# Patient Record
Sex: MALE | Race: WHITE | NOT HISPANIC OR LATINO | Employment: FULL TIME | URBAN - METROPOLITAN AREA
[De-identification: names, ages, dates, MRNs, and addresses within clinical notes are randomized per-mention and may not be internally consistent; named-entity substitution may affect disease eponyms.]

---

## 2017-11-17 ENCOUNTER — COMMUNICATION - HEALTHEAST (OUTPATIENT)
Dept: TELEHEALTH | Facility: CLINIC | Age: 32
End: 2017-11-17

## 2017-11-17 ENCOUNTER — OFFICE VISIT - HEALTHEAST (OUTPATIENT)
Dept: FAMILY MEDICINE | Facility: CLINIC | Age: 32
End: 2017-11-17

## 2017-11-17 DIAGNOSIS — Z23 NEED FOR VACCINATION: ICD-10-CM

## 2017-11-17 DIAGNOSIS — Z00.00 ROUTINE GENERAL MEDICAL EXAMINATION AT A HEALTH CARE FACILITY: ICD-10-CM

## 2017-11-17 DIAGNOSIS — H66.92 ACUTE OTITIS MEDIA, LEFT: ICD-10-CM

## 2017-11-17 DIAGNOSIS — G40.909 EPILEPSY (H): ICD-10-CM

## 2017-11-17 ASSESSMENT — MIFFLIN-ST. JEOR: SCORE: 1909.22

## 2018-08-26 ENCOUNTER — OFFICE VISIT (OUTPATIENT)
Dept: URGENT CARE | Facility: URGENT CARE | Age: 33
End: 2018-08-26
Payer: COMMERCIAL

## 2018-08-26 VITALS
TEMPERATURE: 98.4 F | WEIGHT: 202.5 LBS | SYSTOLIC BLOOD PRESSURE: 135 MMHG | BODY MASS INDEX: 29.48 KG/M2 | OXYGEN SATURATION: 99 % | DIASTOLIC BLOOD PRESSURE: 76 MMHG | HEART RATE: 84 BPM

## 2018-08-26 DIAGNOSIS — M54.9 MID BACK PAIN ON RIGHT SIDE: Primary | ICD-10-CM

## 2018-08-26 PROCEDURE — 99214 OFFICE O/P EST MOD 30 MIN: CPT | Performed by: PHYSICIAN ASSISTANT

## 2018-08-26 NOTE — MR AVS SNAPSHOT
"              After Visit Summary   2018    Fred Alva    MRN: [id number]           Patient Information     Date Of Birth          1985        Visit Information        Provider Department      2018 3:55 PM Joao Pollack PA-C [facility]        Today's Diagnoses     Mid back pain on right side    -  1       Follow-ups after your visit        Who to contact     If you have questions or need follow up information about today's clinic visit or your schedule please contact [facility] directly at [phone].  Normal or non-critical lab and imaging results will be communicated to you by Arkamihart, letter or phone within 4 business days after the clinic has received the results. If you do not hear from us within 7 days, please contact the clinic through Arkamihart or phone. If you have a critical or abnormal lab result, we will notify you by phone as soon as possible.  Submit refill requests through [employer] or call your pharmacy and they will forward the refill request to us. Please allow 3 business days for your refill to be completed.          Additional Information About Your Visit        MyChart Information     [employer] lets you send messages to your doctor, view your test results, renew your prescriptions, schedule appointments and more. To sign up, go to [email or web address] . Click on \"Log in\" on the left side of the screen, which will take you to the Welcome page. Then click on \"Sign up Now\" on the right side of the page.     You will be asked to enter the access code listed below, as well as some personal information. Please follow the directions to create your username and password.     Your access code is: [email or web address]  Expires: 2018  4:31 PM     Your access code will  in 90 days. If you need help or a new code, please call your [place] clinic or [phone].        Care EveryWhere ID     This is your Care EveryWhere ID. This could " be used by other organizations to access your [place] medical records  [id number]        Your Vitals Were     Pulse Temperature Pulse Oximetry BMI (Body Mass Index)          84 98.4  F (36.9  C) 99% 29.48 kg/m2         Blood Pressure from Last 3 Encounters:   08/26/18 135/76   04/02/14 142/90   08/31/11 156/84    Weight from Last 3 Encounters:   08/26/18 202 lb 8 oz (91.9 kg)   04/02/14 238 lb 3.2 oz (108 kg)   08/31/11 229 lb 11.2 oz (104.2 kg)              Today, you had the following     No orders found for display       Primary Care Provider Fax #    Physician No Ref-Primary [phone]       No address on file        Equal Access to Services     BERTIN MONROE : Hadii nima blueo Souma, waaxda luqadaha, qaybta kaalmada adeegyada, jael beckett . So [facility] [phone].    ATENCIÓN: Si habla español, tiene a smith disposición servicios gratuitos de asistencia lingüística. Llame al [phone].    We comply with applicable federal civil rights laws and [place] laws. We do not discriminate on the basis of race, color, national origin, age, disability, sex, sexual orientation, or gender identity.            Thank you!     Thank you for choosing [facility]  for your care. Our goal is always to provide you with excellent care. Hearing back from our patients is one way we can continue to improve our services. Please take a few minutes to complete the written survey that you may receive in the mail after your visit with us. Thank you!             Your Updated Medication List - Protect others around you: Learn how to safely use, store and throw away your medicines at www.disposemymeds.org.          This list is accurate as of 8/26/18  4:31 PM.  Always use your most recent med list.                   Brand Name Dispense Instructions for use Diagnosis    fluconazole 100 MG tablet    DIFLUCAN    15 tablet    2 tabs PO qday x 1 day, then 1 tab PO qday x 13 days for a  total of 14 day course.    [place]

## 2018-08-26 NOTE — PROGRESS NOTES
"SUBJECTIVE  HPI: Fred Alva is a 32 year old male who presents for evaluation of back pain  Symptoms began 1 day(s) ago, have been onset acute and are worse.  Pain is located in the low back right region, with radiation to does not radiate, and are at worst a 6 on a scale of 1-10.  Recent injury:none recalled by the patient. He does circus aerial work.   Personal hx of back pain is no prior back problems.  Pain is exacerbated by: standing, walking, bending and changing position.  Pain is relieved by: OTC NSAIDs[unfilled] sx include: none.  Red flag symptoms: negative.    Past Medical History:   Diagnosis Date     Balance problem      Epilepsy (H)      Current Outpatient Prescriptions   Medication Sig Dispense Refill     fluconazole (DIFLUCAN) 100 MG tablet 2 tabs PO qday x 1 day, then 1 tab PO qday x 13 days for a total of 14 day course. 15 tablet 0     Social History   Substance Use Topics     Smoking status: Current Every Day Smoker     Packs/day: 0.50     Smokeless tobacco: Never Used     Alcohol use Yes      Comment: \"it depends, I drink about 3 days a week\"       ROS:  CONSTITUTIONAL:NEGATIVE for fever, chills, change in weight  : negative for, dysuria, frequency  and hematuria  MUSCULOSKELETAL: back pain    OBJECTIVE:  /76 (BP Location: Left arm, Cuff Size: Adult Regular)  Pulse 84  Temp 98.4  F (36.9  C)  Wt 202 lb 8 oz (91.9 kg)  SpO2 99%  BMI 29.48 kg/m2  Back examination: Back symmetric, no curvature. ROM normal. No CVA tenderness., positive findings: tenderness to palpation right mid back    GENERAL APPEARANCE: healthy, alert and no distress  NEURO: Normal strength and tone with no weakness or sensory deficit noted, reflexes normal   SKIN: no suspicious lesions or rashes    ASSESSMENT/IMPRESSION:    1. Mid back pain on right side   probable musculoskeletal.     PLAN:1) PLEASE SEE ORDER SUMMARY  [unfilled]:      1.  Continue stretching and strengthening exercises.       2.  Continue prn " heat or ice application. Topical analgesics. Ibuprofen 600 mg every 6 hrs as needed. Consider massage. Follow up in clinic if not improving over the next week.

## 2018-11-06 ENCOUNTER — OFFICE VISIT (OUTPATIENT)
Dept: FAMILY MEDICINE | Facility: CLINIC | Age: 33
End: 2018-11-06
Payer: COMMERCIAL

## 2018-11-06 VITALS
DIASTOLIC BLOOD PRESSURE: 85 MMHG | HEART RATE: 91 BPM | OXYGEN SATURATION: 99 % | TEMPERATURE: 98.7 F | HEIGHT: 70 IN | RESPIRATION RATE: 16 BRPM | SYSTOLIC BLOOD PRESSURE: 138 MMHG | WEIGHT: 197 LBS | BODY MASS INDEX: 28.2 KG/M2

## 2018-11-06 DIAGNOSIS — G40.219 PARTIAL EPILEPSY WITH IMPAIRMENT OF CONSCIOUSNESS, INTRACTABLE (H): Primary | ICD-10-CM

## 2018-11-06 DIAGNOSIS — Z23 NEED FOR VACCINATION: ICD-10-CM

## 2018-11-06 PROCEDURE — 99214 OFFICE O/P EST MOD 30 MIN: CPT | Mod: 25 | Performed by: PHYSICIAN ASSISTANT

## 2018-11-06 PROCEDURE — 90686 IIV4 VACC NO PRSV 0.5 ML IM: CPT | Performed by: PHYSICIAN ASSISTANT

## 2018-11-06 PROCEDURE — 90471 IMMUNIZATION ADMIN: CPT | Performed by: PHYSICIAN ASSISTANT

## 2018-11-06 RX ORDER — LAMOTRIGINE 100 MG/1
TABLET ORAL
Qty: 21 TABLET | Refills: 0 | Status: SHIPPED | OUTPATIENT
Start: 2018-11-06 | End: 2018-11-15

## 2018-11-06 NOTE — MR AVS SNAPSHOT
After Visit Summary   11/6/2018    Fred Alva    MRN: [id number]           Patient Information     Date Of Birth          1985        Visit Information        Provider Department      11/6/2018 9:40 AM Guillaume Martinez PA-C [facility]        Today's Diagnoses     Partial epilepsy with impairment of consciousness, intractable (H)    -  1    Need for vaccination           Follow-ups after your visit        Additional Services     NEUROLOGY ADULT REFERRAL       Your provider has referred you for the following:   Consult at [facility]: [facility] [phone]   [email or web address]    Please be aware that coverage of these services is subject to the terms and limitations of your health insurance plan.  Call member services at your health plan with any benefit or coverage questions.      Please bring the following with you to your appointment:    (1) Any X-Rays, CTs or MRIs which have been performed.  Contact the facility where they were done to arrange for  prior to your scheduled appointment.    (2) List of current medications  (3) This referral request   (4) Any documents/labs given to you for this referral                  Who to contact     If you have questions or need follow up information about today's clinic visit or your schedule please contact [facility] directly at [phone].  Normal or non-critical lab and imaging results will be communicated to you by MyChart, letter or phone within 4 business days after the clinic has received the results. If you do not hear from us within 7 days, please contact the clinic through MyChart or phone. If you have a critical or abnormal lab result, we will notify you by phone as soon as possible.  Submit refill requests through [employer] or call your pharmacy and they will forward the refill request to us. Please allow 3 business days for your refill to be completed.        "   Additional Information About Your Visit        MyChart Information     ADOR lets you send messages to your doctor, view your test results, renew your prescriptions, schedule appointments and more. To sign up, go to [email or web address] . Click on \"Log in\" on the left side of the screen, which will take you to the Welcome page. Then click on \"Sign up Now\" on the right side of the page.     You will be asked to enter the access code listed below, as well as some personal information. Please follow the directions to create your username and password.     Your access code is: [email or web address]  Expires: 2018  3:31 PM     Your access code will  in 90 days. If you need help or a new code, please call your [place] clinic or [phone].        Care EveryWhere ID     This is your Care EveryWhere ID. This could be used by other organizations to access your [place] medical records  [id number]        Your Vitals Were     Pulse Temperature Respirations Height Pulse Oximetry BMI (Body Mass Index)    91 98.7  F (37.1  C) (Oral) 16 5' 10\" (1.778 m) 99% 28.27 kg/m2       Blood Pressure from Last 3 Encounters:   18 138/85   18 135/76   14 142/90    Weight from Last 3 Encounters:   18 197 lb (89.4 kg)   18 202 lb 8 oz (91.9 kg)   14 238 lb 3.2 oz (108 kg)              We Performed the Following     ADMIN 1st VACCINE     FLU VAC PRESRV FREE QUAD SPLIT VIR, IM (3+ YRS)     NEUROLOGY ADULT REFERRAL          Today's Medication Changes          These changes are accurate as of 18  9:57 AM.  If you have any questions, ask your nurse or doctor.               Start taking these medicines.        Dose/Directions    lamoTRIgine 100 MG tablet   Commonly known as:  LAMICTAL   Used for:  Partial epilepsy with impairment of consciousness, intractable (H)   Started by:  Guillaume Martinez PA-C        Take 1/2 tablet daily   Quantity:  21 tablet   Refills:  0         Stop taking " these medicines if you haven't already. Please contact your care team if you have questions.     fluconazole 100 MG tablet   Commonly known as:  DIFLUCAN   Stopped by:  Guillaume Martinez PA-C                Where to get your medicines      These medications were sent to [place] [id number] [facility] - [place], [place] - [address]  [address] [address]     Phone:  [phone]     lamoTRIgine 100 MG tablet                Primary Care Provider Fax #    Physician No Ref-Primary [phone]       No address on file        Equal Access to Services     SHANNAN [facility]LEEN : Hadii aad ku hadasho Soomaali, waaxda luqadaha, qaybta kaalmada adeegyada, waxay idiin hayzoran beckett . So [facility] [phone].    ATENCIÓN: Si habla español, tiene a smith disposición servicios gratuitos de asistencia lingüística. Llame al [phone].    We comply with applicable federal civil rights laws and [place] laws. We do not discriminate on the basis of race, color, national origin, age, disability, sex, sexual orientation, or gender identity.            Thank you!     Thank you for choosing [facility]  for your care. Our goal is always to provide you with excellent care. Hearing back from our patients is one way we can continue to improve our services. Please take a few minutes to complete the written survey that you may receive in the mail after your visit with us. Thank you!             Your Updated Medication List - Protect others around you: Learn how to safely use, store and throw away your medicines at www.disposemymeds.org.          This list is accurate as of 11/6/18  9:57 AM.  Always use your most recent med list.                   Brand Name Dispense Instructions for use Diagnosis    lamoTRIgine 100 MG tablet    LAMICTAL    21 tablet    Take 1/2 tablet daily    Partial epilepsy with impairment of consciousness, intractable (H)

## 2018-11-06 NOTE — NURSING NOTE
Screening Questionnaire for Adult Immunization    Are you sick today?   No   Do you have allergies to medications, food, a vaccine component or latex?   No   Have you ever had a serious reaction after receiving a vaccination?   No   Do you have a long-term health problem with heart disease, lung disease, asthma, kidney disease, metabolic disease (e.g. diabetes), anemia, or other blood disorder?   No   Do you have cancer, leukemia, HIV/AIDS, or any other immune system problem?   No   In the past 3 months, have you taken medications that affect  your immune system, such as prednisone, other steroids, or anticancer drugs; drugs for the treatment of rheumatoid arthritis, Crohn s disease, or psoriasis; or have you had radiation treatments?   No   Have you had a seizure, or a brain or other nervous system problem?   No   During the past year, have you received a transfusion of blood or blood     products, or been given immune (gamma) globulin or antiviral drug?   No   For women: Are you pregnant or is there a chance you could become        pregnant during the next month?   No   Have you received any vaccinations in the past 4 weeks?   No     Immunization questionnaire answers were all negative.      Prior to injection verified patient identity using patient's name and date of birth.  Due to injection administration, patient instructed to remain in clinic for 15 minutes  afterwards, and to report any adverse reaction to me immediately.      Per orders of Teresa LUNA, injection of flu given by Alissa Barker. Patient instructed to remain in clinic for 15 minutes afterwards, and to report any adverse reaction to me immediately.       Screening performed by Alissa Barker on 11/6/2018 at 11:06 AM.

## 2018-11-06 NOTE — PROGRESS NOTES
"  SUBJECTIVE:   Fred Alva is a 33 year old male who presents to clinic today for the following health issues:      Patient states that he has been diagnosed with Epilepsy and for the past 8 years had not had any episodes until last month he had an episode and last week had an ora . Pt is currently not taking any medications for symptoms         Problem list and histories reviewed & adjusted, as indicated.  Additional history: He was last seen by neuro 8/31/2011.  He had been seizure free for about 8 years.  He has since stopped his lamictal.  Last month he had an episode with aura, and wonders if seizures are coming back.  He had this similar in his 20s.  Was diagnosed as kid, did great and was seizure free for about 10 years, and did stop treatment.  Wonders if this is just his routine.  Does admit to some new stressors as well.    BP Readings from Last 3 Encounters:   11/06/18 138/85   08/26/18 135/76   04/02/14 142/90    Wt Readings from Last 3 Encounters:   11/06/18 197 lb (89.4 kg)   08/26/18 202 lb 8 oz (91.9 kg)   04/02/14 238 lb 3.2 oz (108 kg)                    Reviewed and updated as needed this visit by clinical staff       Reviewed and updated as needed this visit by Provider         ROS:  Constitutional, HEENT, cardiovascular, pulmonary, gi and gu systems are negative, except as otherwise noted.    OBJECTIVE:     /85  Pulse 91  Temp 98.7  F (37.1  C) (Oral)  Resp 16  Ht 5' 10\" (1.778 m)  Wt 197 lb (89.4 kg)  SpO2 99%  BMI 28.27 kg/m2  Body mass index is 28.27 kg/(m^2).  GENERAL: alert and no distress  EYES: Eyes grossly normal to inspection  RESP: lungs clear to auscultation - no rales, rhonchi or wheezes  CV: regular rate and rhythm, normal S1 S2, no S3 or S4, no murmur, click or rub, no peripheral edema and peripheral pulses strong  NEURO: Normal strength and tone, mentation intact and speech normal  PSYCH: mentation appears normal, affect normal/bright    Diagnostic Test " Results:  none     ASSESSMENT/PLAN:             1. Partial epilepsy with impairment of consciousness, intractable (H)  He would like to restart lamictal, and I think it makes sense for him to get back into neurology for further evaluation since it has been about 7 years.  - NEUROLOGY ADULT REFERRAL  - lamoTRIgine (LAMICTAL) 100 MG tablet; Take 1/2 tablet daily  Dispense: 21 tablet; Refill: 0    2. Need for vaccination    - ADMIN 1st VACCINE  - FLU VAC PRESRV FREE QUAD SPLIT VIR, IM (3+ YRS)        Guillaume Martinez PA-C  [facility]

## 2018-11-06 NOTE — NURSING NOTE
"Chief Complaint   Patient presents with     Epilepsy     /85  Pulse 91  Temp 98.7  F (37.1  C) (Oral)  Resp 16  Ht 5' 10\" (1.778 m)  Wt 197 lb (89.4 kg)  SpO2 99%  BMI 28.27 kg/m2 Estimated body mass index is 28.27 kg/(m^2) as calculated from the following:    Height as of this encounter: 5' 10\" (1.778 m).    Weight as of this encounter: 197 lb (89.4 kg).  bp completed using cuff size: regular       Health Maintenance addressed:  PHQ9    Possibly completing today per provider review.    Alissa Barker MA     "

## 2018-11-15 ENCOUNTER — OFFICE VISIT (OUTPATIENT)
Dept: NEUROLOGY | Facility: CLINIC | Age: 33
End: 2018-11-15
Payer: COMMERCIAL

## 2018-11-15 VITALS
DIASTOLIC BLOOD PRESSURE: 79 MMHG | BODY MASS INDEX: 28.93 KG/M2 | RESPIRATION RATE: 16 BRPM | HEART RATE: 85 BPM | WEIGHT: 201.6 LBS | SYSTOLIC BLOOD PRESSURE: 120 MMHG

## 2018-11-15 DIAGNOSIS — G40.109 TEMPORAL LOBE EPILEPSY (H): Primary | ICD-10-CM

## 2018-11-15 RX ORDER — LAMOTRIGINE 100 MG/1
TABLET ORAL
Qty: 120 TABLET | Refills: 4 | Status: SHIPPED | OUTPATIENT
Start: 2018-11-15 | End: 2019-04-01

## 2018-11-15 ASSESSMENT — PAIN SCALES - GENERAL: PAINLEVEL: NO PAIN (0)

## 2018-11-15 NOTE — PROGRESS NOTES
"Service Date: 11/15/2018      INTRODUCTION:  The patient is a 33-year-old right-handed male with history of temporal lobe epilepsy who was referred by Dr. Guillaume Martinez for recurrence of the patient's seizures after being controlled for almost a decade.  History was taken from the patient, and the notes from  [facility].      HISTORY OF PRESENT ILLNESS:  The patient was seen by Dr. Rambo Washington between 09/2009-08/2011.  His seizures started at approximately age 14.  He had GTC seizures at that time which continued for approximately 2-3 years.  According to Dr. Washington's notes, He was taking Paxil for his anxiety at that time and he had a handful of seizures until Paxil was discontinued and seizures stopped.  He does not recall much of those GTC seizures.  He recalls he was on Depakote at some point and had EEGs and MRIs which results were not available.  Then he started having seizures again in his mid-20s.  His seizures are stereotypical.  They start with an aura, consist of a crescendo sound, which lasts approximately 3 seconds, and he has this \"roller coaster feeling,\" then he would blank out for 10-15 seconds.  Friends have witnessed fingers or toes twitching, then he would be confused for a few minutes and then the whole day he would be off and goes to sleep.  He denies tongue biting, bowel or bladder incontinence, headaches or muscle aches with these seizures.  Denies oral or manual automatisms.  He was diagnosed with temporal lobe epilepsy in 01/2008.  He continued having breakthrough seizures on carbamazepine.  Then he was started on lamotrigine and seizures got under control.  His last documented focal impaired aware seizure was in 04/2009, according to Dr. Washington's note.  The patient does not recall having isolated auras; however, according to Dr. Washington's note between August and December 2010, he had approximately 4 typical auras (with a complex auditory illusion and hallucination), which resolved with increasing " lamotrigine.  He did not have any more generalized tonic-clonic seizures, except the ones that happened in his teenage years.  He took himself off lamotrigine gradually and has been off medication for approximately 7 years.     Almost 3 weeks ago, he experienced seizure-like feelings in 2 separate nights.  He describes one night he woke up with a headache.  His pupils were dilated.  His eyes were puffy.  He was exhausted and confused.  It felt like the feeling he had when he had a seizure in the past.  Another night, he woke up and noted alarm clock was going for 20 minutes, which is unusual for him not to wake up to alarm clock.  Stress might have triggered these since he moved from [place] to [place] recently and also has been putting together a performance in [employer], which was a huge performance and was stressful.  Last week, he saw Dr. Martinez and was restarted on lamotrigine, currently taking 50 mg daily.      RISK FACTORS FOR EPILEPSY:  He denies history of meningitis, encephalitis, febrile seizures, family history of epilepsy or known stroke or tumor.  He had a small concussion in 2007, in which he did not lose consciousness, but bumped his head on the wall while sitting in a chair.  Subsequently, he developed blurred vision and headaches.      PREVIOUS TESTING FOR EPILEPSY:  EEG on 09/22/2009 read by Dr. Washington was a normal awake and drowsy EEG and ambulatory EEG on 02/04/2010 read by Dr. Martin was abnormal due to the presence of a single well-formed left temporal sharp wave.  A brain MRI on 09/18/2009 at the [facility] was unremarkable.      MEDICATIONS:   1.  Lamotrigine, 50 mg daily.      OTHER AED TRIALS:  Depakote and carbamazepine (had breakthrough seizures).      PAST MEDICAL HISTORY:  History of depression and anxiety in remission and recurrent kidney stones, last one 3 months ago.      SOCIAL AND EDUCATIONAL HISTORY:  The patient attended regular classes, graduated high  school.  He got a master's degree in theater and education, moved [place] from  in 07/2009.  He recently moved from [place] to [place].  He works as an  at [employer], which is a [employer] center for adults with disability, for the past 1-1/2 years.  Before that he worked for other [employer] companies for 17 years.  He is single, has no children.  He drinks 3-4 times a week, between 2-5 drinks.  He smokes less than pack a day.  Denies illicit drugs or marijuana.  No history of drug use.      REVIEW OF SYSTEMS:  Complete review of system was done and was positive for significant weight loss, hoarseness, headaches and sleep problems.      PHYSICAL EXAMINATION:   /79  Pulse 85  Resp 16  Wt 201 lb 9.6 oz (91.4 kg)  BMI 28.93 kg/m2  GENERAL APPEARANCE:  Alert, awake, cooperative and pleasant, no apparent distress.   MENTAL STATUS:  Alert and oriented.   CRANIAL NERVES:  Pupils are round and reactive to light.  Extraocular movements are intact.  Facial sensation is intact to light touch.  Face is symmetric.  Tongue is midline.  Shrug shoulder is normal.  Visual field full.   COORDINATION:  Normal finger-to-nose.   SENSATION:  Intact to light touch.   MOTOR EXAM:  Normal tone, bulk and strength 5/5.   REFLEXES:  DTRs 2+ symmetric, toes are downgoing.   GAIT:  Gait and tandem gait are steady.      ASSESSMENT/PLAN:  A 33-year-old right-handed male with history of temporal lobe epilepsy, whose seizures were controlled for approximately 9 years, partly on medication and 7 years off medication, had 2 unwitnessed events in sleep 3 weeks ago, which felt like seizures. He was restarted on lamotrigine last week.  He denies side effects.  He has tried carbamazepine in the past but had breakthrough seizures.  He has been on as high as lamotrigine 600 mg daily in the past.  However, he states he was much heavier at that time.  I discussed other treatment options as well as titrating up  lamotrigine, although it takes approximately 6-7 weeks to get to the optimum level.  He prefers to stay on lamotrigine as he has been on it and is used to it.  I gave him the verbal and written instructions to increase his lamotrigine to 200 mg twice a day.  I also discussed doing an EEG and brain MRI.     [place] regulations on seizures and driving were reviewed with the patient.  The patient clearly understands that she/he is prohibited from operating a motor vehicle within 3 months following any seizure (that will impair control of car) or other episode with sudden unconsciousness or inability to sit up, and that she/he is required to report this most recent seizure to the [facility] within 30 days after the event.    Avoid any activities that might lead to self-injury or injury of others, within 3 months following any seizure with impaired awareness or impaired motor control such activities include but are not limited to operating power tools, operating firearms, climbing ladders/trees/exposure to heights from which he might fall, exposure to vessels with hot cooking oil or water, and swimming alone.  1.  Up titrate lamotrigine as instructed to 200 mg bid.  2.  A 3-hour video EEG at [facility].   3.  A 3T brain MRI with seizure protocol without contrast.   4.  Obtain lamotrigine level in 8 weeks.   5.  Return to clinic in 3 months.         UMBERTO LOPEZ MD             D: 11/15/2018   T: 11/15/2018   MT: al      Name:     STELLA SWAIN   MRN:      [id number]        Account:      [id number]   :      1985           Service Date: 11/15/2018      Document: [id number]

## 2018-11-15 NOTE — LETTER
"11/15/2018       RE: Fred Alva  : 1985   MRN: [id number]      Dear Colleague,    Thank you for referring your patient, Fred Alva, to the [facility]PI[facility] at [facility]. Please see a copy of my visit note below.    Service Date: 11/15/2018      INTRODUCTION:  The patient is a 33-year-old right-handed male with history of temporal lobe epilepsy who was referred by Dr. Guillaume Martinez for recurrence of the patient's seizures after being controlled for almost a decade.  History was taken from the patient, and the notes from  [facility].      HISTORY OF PRESENT ILLNESS:  The patient was seen by Dr. Rambo Washington between 2009-2011.  His seizures started at approximately age 14.  He had GTC seizures at that time which continued for approximately 2-3 years.  According to Dr. Washington's notes, He was taking Paxil for his anxiety at that time and he had a handful of seizures until Paxil was discontinued and seizures stopped.  He does not recall much of those GTC seizures.  He recalls he was on Depakote at some point and had EEGs and MRIs which results were not available.  Then he started having seizures again in his mid-20s.  His seizures are stereotypical.  They start with an aura, consist of a crescendo sound, which lasts approximately 3 seconds, and he has this \"roller coaster feeling,\" then he would blank out for 10-15 seconds.  Friends have witnessed fingers or toes twitching, then he would be confused for a few minutes and then the whole day he would be off and goes to sleep.  He denies tongue biting, bowel or bladder incontinence, headaches or muscle aches with these seizures.  Denies oral or manual automatisms.  He was diagnosed with temporal lobe epilepsy in 2008.  He continued having breakthrough seizures on carbamazepine.  Then he was started on lamotrigine and seizures got under control.  His last documented focal impaired aware seizure was in 2009, " according to Dr. Washington's note.  The patient does not recall having isolated auras; however, according to Dr. Washington's note between August and December 2010, he had approximately 4 typical auras (with a complex auditory illusion and hallucination), which resolved with increasing lamotrigine.  He did not have any more generalized tonic-clonic seizures, except the ones that happened in his teenage years.  He took himself off lamotrigine gradually and has been off medication for approximately 7 years.     Almost 3 weeks ago, he experienced seizure-like feelings in 2 separate nights.  He describes one night he woke up with a headache.  His pupils were dilated.  His eyes were puffy.  He was exhausted and confused.  It felt like the feeling he had when he had a seizure in the past.  Another night, he woke up and noted alarm clock was going for 20 minutes, which is unusual for him not to wake up to alarm clock.  Stress might have triggered these since he moved from [place] to [place] recently and also has been putting together a performance in [employer], which was a huge performance and was stressful.  Last week, he saw Dr. Martinez and was restarted on lamotrigine, currently taking 50 mg daily.      RISK FACTORS FOR EPILEPSY:  He denies history of meningitis, encephalitis, febrile seizures, family history of epilepsy or known stroke or tumor.  He had a small concussion in 2007, in which he did not lose consciousness, but bumped his head on the wall while sitting in a chair.  Subsequently, he developed blurred vision and headaches.      PREVIOUS TESTING FOR EPILEPSY:  EEG on 09/22/2009 read by Dr. Washington was a normal awake and drowsy EEG and ambulatory EEG on 02/04/2010 read by Dr. Martin was abnormal due to the presence of a single well-formed left temporal sharp wave.  A brain MRI on 09/18/2009 at the [facility] was unremarkable.      MEDICATIONS:   1.  Lamotrigine, 50 mg daily.      OTHER AED TRIALS:   Depakote and carbamazepine (had breakthrough seizures).      PAST MEDICAL HISTORY:  History of depression and anxiety in remission and recurrent kidney stones, last one 3 months ago.      SOCIAL AND EDUCATIONAL HISTORY:  The patient attended regular classes, graduated high school.  He got a master's degree in theater and education, moved to  from  in 07/2009.  He recently moved from [place] to [place].  He works as an  at [employer], which is a [employer] center for adults with disability, for the past 1-1/2 years.  Before that he worked for other [employer] companies for 17 years.  He is single, has no children.  He drinks 3-4 times a week, between 2-5 drinks.  He smokes less than pack a day.  Denies illicit drugs or marijuana.  No history of drug use.      REVIEW OF SYSTEMS:  Complete review of system was done and was positive for significant weight loss, hoarseness, headaches and sleep problems.      PHYSICAL EXAMINATION:   /79  Pulse 85  Resp 16  Wt 201 lb 9.6 oz (91.4 kg)  BMI 28.93 kg/m2  GENERAL APPEARANCE:  Alert, awake, cooperative and pleasant, no apparent distress.   MENTAL STATUS:  Alert and oriented.   CRANIAL NERVES:  Pupils are round and reactive to light.  Extraocular movements are intact.  Facial sensation is intact to light touch.  Face is symmetric.  Tongue is midline.  Shrug shoulder is normal.  Visual field full.   COORDINATION:  Normal finger-to-nose.   SENSATION:  Intact to light touch.   MOTOR EXAM:  Normal tone, bulk and strength 5/5.   REFLEXES:  DTRs 2+ symmetric, toes are downgoing.   GAIT:  Gait and tandem gait are steady.      ASSESSMENT/PLAN:  A 33-year-old right-handed male with history of temporal lobe epilepsy, whose seizures were controlled for approximately 9 years, partly on medication and 7 years off medication, had 2 unwitnessed events in sleep 3 weeks ago, which felt like seizures. He was restarted on lamotrigine last week.  He denies  side effects.  He has tried carbamazepine in the past but had breakthrough seizures.  He has been on as high as lamotrigine 600 mg daily in the past.  However, he states he was much heavier at that time.  I discussed other treatment options as well as titrating up lamotrigine, although it takes approximately 6-7 weeks to get to the optimum level.  He prefers to stay on lamotrigine as he has been on it and is used to it.  I gave him the verbal and written instructions to increase his lamotrigine to 200 mg twice a day.  I also discussed doing an EEG and brain MRI.     [place] regulations on seizures and driving were reviewed with the patient.  The patient clearly understands that she/he is prohibited from operating a motor vehicle within 3 months following any seizure (that will impair control of car) or other episode with sudden unconsciousness or inability to sit up, and that she/he is required to report this most recent seizure to the DM within 30 days after the event.    Avoid any activities that might lead to self-injury or injury of others, within 3 months following any seizure with impaired awareness or impaired motor control such activities include but are not limited to operating power tools, operating firearms, climbing ladders/trees/exposure to heights from which he might fall, exposure to vessels with hot cooking oil or water, and swimming alone.  1.  Up titrate lamotrigine as instructed to 200 mg bid.  2.  A 3-hour video EEG at [facility].   3.  A 3T brain MRI with seizure protocol without contrast.   4.  Obtain lamotrigine level in 8 weeks.   5.  Return to clinic in 3 months.         UMBERTO LOPEZ MD             D: 11/15/2018   T: 11/15/2018   MT: al      Name:     STELLA SWAIN   MRN:      [id number]        Account:      [id number]   :      1985           Service Date: 11/15/2018      Document: [id number]        Again, thank you for allowing me to participate in the care of your  patient.      Sincerely,    Shira Sanchez MD

## 2018-11-15 NOTE — PATIENT INSTRUCTIONS
Times of Days   am pm       Medication Tablet Size Number of Tablets/Capsules Total Daily Dosage    lamotrigine 100 mg    1/2            wk 1      1/2  1/2      100    wk 2      1  1/2      150    wk 3      1  1      200    wk 4      1 1/2  1      250    wk 5      1 1/2  1 1/2      300    wk 6      2 1 1/2      350    wk 7 and thereafter      2 2      400     Carry this with you at all times.  CONTINUE TAKING YOUR OTHER MEDICATIONS AS PREVIOUSLY DIRECTED.      * * *Do not store medications in the bathroom.  Keep medications away from children!* * *

## 2018-11-15 NOTE — MR AVS SNAPSHOT
After Visit Summary   11/15/2018    Fred Alva    MRN: [id number]           Patient Information     Date Of Birth          1985        Visit Information        Provider Department      11/15/2018 8:00 AM Shira Sanchez MD [facility]        Today's Diagnoses     Temporal lobe epilepsy (H)    -  1      Care Instructions      Times of Days   am pm       Medication Tablet Size Number of Tablets/Capsules Total Daily Dosage    lamotrigine 100 mg    1/2            wk 1      1/2  1/2      100    wk 2      1  1/2      150    wk 3      1  1      200    wk 4      1 1/2  1      250    wk 5      1 1/2  1 1/2      300    wk 6      2 1 1/2      350    wk 7 and thereafter      2 2      400     Carry this with you at all times.  CONTINUE TAKING YOUR OTHER MEDICATIONS AS PREVIOUSLY DIRECTED.      * * *Do not store medications in the bathroom.  Keep medications away from children!* * *             Follow-ups after your visit        Follow-up notes from your care team     Return in about 3 months (around 2/15/2019).      Your next 10 appointments already scheduled     Nov 16, 2018  9:30 AM CST   [facility]   [facility]    [address], [address]  [facility] [place] [facility] [id number] [phone]            Feb 18, 2019 11:30 AM CST   Return Visit with Shira Sanchez MD   [facility] ([facility])    [address], [address]  [facility] [id number] [phone]              Future tests that were ordered for you today     Open Future Orders        Priority Expected Expires Ordered    ORDER:  EEG video monitoring Routine  2/13/2019 11/15/2018    MR Brain w/o Contrast Routine  11/15/2019 11/15/2018            Who to contact     Please call your clinic at [phone] to:    Ask questions about your health    Make or cancel appointments    Discuss your medicines    Learn about your test results    Speak to your  doctor            Additional Information About Your Visit        Appearhart Information     [email or web address] is an electronic gateway that provides easy, online access to your medical records. With [email or web address], you can request a clinic appointment, read your test results, renew a prescription or communicate with your care team.     To sign up for [email or web address] visit the website at [email or web address]   You will be asked to enter the access code listed below, as well as some personal information. Please follow the directions to create your username and password.     Your access code is: [email or web address]  Expires: 2018  3:31 PM     Your access code will  in 90 days. If you need help or a new code, please contact your [facility] or call [phone] for assistance.        Care EveryWhere ID     This is your Care EveryWhere ID. This could be used by other organizations to access your [facility] medical records  [id number]        Your Vitals Were     Pulse Respirations BMI (Body Mass Index)             85 16 28.93 kg/m2          Blood Pressure from Last 3 Encounters:   11/15/18 120/79   18 138/85   18 135/76    Weight from Last 3 Encounters:   11/15/18 201 lb 9.6 oz (91.4 kg)   18 197 lb (89.4 kg)   18 202 lb 8 oz (91.9 kg)              We Performed the Following     Lamotrigine Level          Today's Medication Changes          These changes are accurate as of 11/15/18  9:14 AM.  If you have any questions, ask your nurse or doctor.               These medicines have changed or have updated prescriptions.        Dose/Directions    lamoTRIgine 100 MG tablet   Commonly known as:  LaMICtal   This may have changed:  additional instructions   Used for:  Temporal lobe epilepsy (H)   Changed by:  Shira Sanchez MD        Start with 1/2 tab twice a day and titrate up as instructed to 2 tabs twice a day   Quantity:  120 tablet   Refills:  4            Where to get your  medicines      These medications were sent to [facility] [address] [facility] - [place], [place] - [address]  [address] [address]     Phone:  [phone]     lamoTRIgine 100 MG tablet                Primary Care Provider Fax #    Physician No Ref-Primary [phone]       No address on file        Equal Access to Services     BERTIN MONROE : Hadii nima ku hadasho Soomaali, waaxda luqadaha, qaybta kaalmada adeegyada, jael terrazaszoalice leiva. So [facility] [phone].    ATENCIÓN: Si habla español, tiene a smith disposición servicios gratuitos de asistencia lingüística. Treasure al [phone].    We comply with applicable federal civil rights laws and [place] laws. We do not discriminate on the basis of race, color, national origin, age, disability, sex, sexual orientation, or gender identity.            Thank you!     Thank you for choosing [facility]  for your care. Our goal is always to provide you with excellent care. Hearing back from our patients is one way we can continue to improve our services. Please take a few minutes to complete the written survey that you may receive in the mail after your visit with us. Thank you!             Your Updated Medication List - Protect others around you: Learn how to safely use, store and throw away your medicines at www.disposemymeds.org.          This list is accurate as of 11/15/18  9:14 AM.  Always use your most recent med list.                   Brand Name Dispense Instructions for use Diagnosis    lamoTRIgine 100 MG tablet    LaMICtal    120 tablet    Start with 1/2 tab twice a day and titrate up as instructed to 2 tabs twice a day    Temporal lobe epilepsy (H)

## 2018-11-16 ENCOUNTER — ALLIED HEALTH/NURSE VISIT (OUTPATIENT)
Dept: NEUROLOGY | Facility: CLINIC | Age: 33
End: 2018-11-16
Payer: COMMERCIAL

## 2018-11-16 DIAGNOSIS — G40.109 TEMPORAL LOBE EPILEPSY (H): ICD-10-CM

## 2018-11-16 NOTE — PROGRESS NOTES
CPT 57833-66  OP/3hr Video EEG  [facility]

## 2018-11-20 ENCOUNTER — TRANSFERRED RECORDS (OUTPATIENT)
Dept: HEALTH INFORMATION MANAGEMENT | Facility: CLINIC | Age: 33
End: 2018-11-20

## 2018-11-21 NOTE — PROCEDURES
Procedure Date: 2018      EEG #:  [id number]      DATE OF STUDY:  2018      SOURCE FILE DURATION:  2 hours 52 minutes 38 seconds.      CLINICAL SUMMARY:  The patient is a 33-year-old right-handed male with history of temporal lobe epilepsy who underwent video EEG monitoring for evaluation of seizures.  The patient is currently on lamotrigine.     TECHNICAL SUMMARY: This continuous video- EEG monitoring procedure was performed with 23 scalp electrodes in 10-20 electrode system placement, and additional scalp, precordial and other surface electrodes used for electrical referencing and artifact detection.  Video monitoring was utilized and periodically reviewed by EEG technologists and the physician for electroclinical correlations.     INTERICTAL ACTIVITY:  During maximal wakefulness, there was 11 Hz alpha activity over the posterior head regions which was symmetric and attenuated by eye opening.  Drowsiness was manifested as dropout of the posterior dominant rhythm and diffuse theta activity and horizontal eye movements.  No epileptiform discharges or focal slowing were present.      ACTIVATION MANEUVERS:  Photic stimulation and hyperventilation did not induce an abnormal activity on the EEG.      CLINICAL/ICTAL EVENTS:  No electrographic or clinical seizures were recorded.      IMPRESSION:  Normal awake and drowsy video EEG.  No epileptiform discharges were present.  No electrographic or clinical seizures were recorded.  Clinical correlation advised.         UMBERTO LOPEZ MD             D: 2018   T: 2018   MT: haroon      Name:     STELLA SWAIN   MRN:      [id number]        Account:        [id number]   :      1985           Procedure Date: 2018      Document: [id number]

## 2018-12-21 NOTE — MR AVS SNAPSHOT
After Visit Summary   11/16/2018    Fred Alva    MRN: [id number]           Patient Information     Date Of Birth          1985        Visit Information        Provider Department      11/16/2018 9:30 AM [facility]        Today's Diagnoses     Temporal lobe epilepsy (H)           Follow-ups after your visit        Your next 10 appointments already scheduled     Feb 18, 2019 11:30 AM CST   Return Visit with Shira Sanchez MD   [facility] ([facility])    [address], [address]  [facility] [id number] [phone]              Who to contact     Please call your clinic at [phone] to:    Ask questions about your health    Make or cancel appointments    Discuss your medicines    Learn about your test results    Speak to your doctor            Additional Information About Your Visit        MyChart Information     [employer] gives you secure access to your electronic health record. If you see a primary care provider, you can also send messages to your care team and make appointments. If you have questions, please call your primary care clinic.  If you do not have a primary care provider, please call [phone] and they will assist you.      [employer] is an electronic gateway that provides easy, online access to your medical records. With [employer], you can request a clinic appointment, read your test results, renew a prescription or communicate with your care team.     To access your existing account, please contact your [facility] or call [phone] for assistance.        Care EveryWhere ID     This is your Care EveryWhere ID. This could be used by other organizations to access your [facility] medical records  [id number]         Blood Pressure from Last 3 Encounters:   No data found for BP    Weight from Last 3 Encounters:   No data found for Wt              Today, you had the following     No orders  found for display       Primary Care Provider Fax #    Physician No Ref-Primary [phone]       No address on file        Equal Access to Services     BERTIN MONROE : Hadbharat aad ku hadjosephsandhya Libia, emileda donavanthomasha, patricia preston, jael kujose alberto cali. So [facility] [phone].    ATENCIÓN: Si habla español, tiene a smith disposición servicios gratuitos de asistencia lingüística. Llame al [phone].    We comply with applicable federal civil rights laws and [place] laws. We do not discriminate on the basis of race, color, national origin, age, disability, sex, sexual orientation, or gender identity.            Thank you!     Thank you for choosing [facility]  for your care. Our goal is always to provide you with excellent care. Hearing back from our patients is one way we can continue to improve our services. Please take a few minutes to complete the written survey that you may receive in the mail after your visit with us. Thank you!             Your Updated Medication List - Protect others around you: Learn how to safely use, store and throw away your medicines at www.disposemymeds.org.          This list is accurate as of 11/16/18 11:59 PM.  Always use your most recent med list.                   Brand Name Dispense Instructions for use Diagnosis    lamoTRIgine 100 MG tablet    LaMICtal    120 tablet    Start with 1/2 tab twice a day and titrate up as instructed to 2 tabs twice a day    Temporal lobe epilepsy (H)          Local with sedation

## 2019-02-18 ENCOUNTER — OFFICE VISIT (OUTPATIENT)
Dept: NEUROLOGY | Facility: CLINIC | Age: 34
End: 2019-02-18
Payer: COMMERCIAL

## 2019-02-18 VITALS
BODY MASS INDEX: 27.84 KG/M2 | TEMPERATURE: 98.4 F | DIASTOLIC BLOOD PRESSURE: 74 MMHG | SYSTOLIC BLOOD PRESSURE: 121 MMHG | HEART RATE: 80 BPM | WEIGHT: 194 LBS

## 2019-02-18 DIAGNOSIS — G40.109 FOCAL EPILEPSY (H): Primary | ICD-10-CM

## 2019-02-18 PROCEDURE — 99000 SPECIMEN HANDLING OFFICE-LAB: CPT | Performed by: PSYCHIATRY & NEUROLOGY

## 2019-02-18 PROCEDURE — 80175 DRUG SCREEN QUAN LAMOTRIGINE: CPT | Mod: 90 | Performed by: PSYCHIATRY & NEUROLOGY

## 2019-02-18 ASSESSMENT — PAIN SCALES - GENERAL: PAINLEVEL: NO PAIN (0)

## 2019-02-18 NOTE — PROGRESS NOTES
[facility]      Patient:  Fred Alva  :  1985   Age:  33 year old   Today's Office Visit:  2019    Epilepsy Data:    History of temporal love epilepsy. EEG 2018 normal. MRI brain 18 normal.     History of Present Illness:    Fred is here for a follow up on his seizures. Has a history of temporal lobe epilepsy. Seizures were controlled for a long time but they recurred. He was restarted on lamotrigine. In the previous visit, we increased lamotrigine to 200 mg bid. He denies side effects.       Current Outpatient Medications   Medication Sig Dispense Refill     lamoTRIgine (LAMICTAL) 100 MG tablet Start with 1/2 tab twice a day and titrate up as instructed to 2 tabs twice a day 120 tablet 4        Medication Notes:        AED Medication Compliance:  compliant most of the time    Review of Systems:  Lethargy / Tiredness:  No  Nausea / Vomiting:  No  Double Vision:  No  Sleepiness:  No  Depression:  No  Slowed Cognitive Function:  No  Memory Problems:  No  Poor Balance:  No  Dizziness:  No  Appetite Changes:  No  Blurred Vision:  No  Sleep problems: sleeps 5-6 hours per night.   Behavioral Changes:  No  Skin: negative  Respiratory: No shortness of breath and No cough  Cardiovascular: negative  Have you experienced a traumatic fall since your last visit: NO    Other Issues:    Is patient safe to drive:  Yes    Exam:    /71 (BP Location: Left arm, Patient Position: Chair, Cuff Size: Adult Regular)   Pulse 99   Temp 98.4  F (36.9  C)   Wt 194 lb (88 kg)   BMI 27.84 kg/m       Wt Readings from Last 5 Encounters:   19 194 lb (88 kg)   11/15/18 201 lb 9.6 oz (91.4 kg)   18 197 lb (89.4 kg)   18 202 lb 8 oz (91.9 kg)   14 238 lb 3.2 oz (108 kg)     General Appearance: Alert, awake, cooperative, pleasant, NAD  Gait and tandem gait: steady  Attention Span:  Normal  Language/speech: no aphasia or dysarthria  Extraocular Movements:   Normal  Coordination:  Normal FNF  Facial Sensation:  Normal  Facial Strength:  Normal  Tongue Strength:  Normal  Motor Exam: normal tone, bulk and strength 5/5 bilaterally  Limb Sensation:  Normal  DTRs: 2+ symmetric, toes downgoing    Assessment and Plan:    Temporal lobe epilepsy: seizures were controlled for a long time and he was off medication. They recurred and he was restarted on lamotrigine. No seizures on lamotrigine. He is taking 200 mg bid. Denies side effects.     - Continue lamotrigine 200 mg bid.   - Obtain lamotrigine level for efficacy.  - RTC in 6 months.       As described above, I met with the patient for 25 minutes and during this time counseling was greater than 50% of the visit time.  Shira Sanchez MD

## 2019-02-18 NOTE — LETTER
2019       RE: Fred Alva  : 1985   MRN: [id number]      Dear Colleague,    Thank you for referring your patient, Fred Alva, to the [facility] at [facility]. Please see a copy of my visit note below.    [facility]      Patient:  Fred Alva  :  1985   Age:  33 year old   Today's Office Visit:  2019    Epilepsy Data:    History of temporal love epilepsy. EEG 2018 normal. MRI brain 18 normal.     History of Present Illness:    Fred is here for a follow up on his seizures. Has a history of temporal lobe epilepsy. Seizures were controlled for a long time but they recurred. He was restarted on lamotrigine. In the previous visit, we increased lamotrigine to 200 mg bid. He denies side effects.       Current Outpatient Medications   Medication Sig Dispense Refill     lamoTRIgine (LAMICTAL) 100 MG tablet Start with 1/2 tab twice a day and titrate up as instructed to 2 tabs twice a day 120 tablet 4        Medication Notes:        AED Medication Compliance:  compliant most of the time    Review of Systems:  Lethargy / Tiredness:  No  Nausea / Vomiting:  No  Double Vision:  No  Sleepiness:  No  Depression:  No  Slowed Cognitive Function:  No  Memory Problems:  No  Poor Balance:  No  Dizziness:  No  Appetite Changes:  No  Blurred Vision:  No  Sleep problems: sleeps 5-6 hours per night.   Behavioral Changes:  No  Skin: negative  Respiratory: No shortness of breath and No cough  Cardiovascular: negative  Have you experienced a traumatic fall since your last visit: NO    Other Issues:    Is patient safe to drive:  Yes    Exam:    /71 (BP Location: Left arm, Patient Position: Chair, Cuff Size: Adult Regular)   Pulse 99   Temp 98.4  F (36.9  C)   Wt 194 lb (88 kg)   BMI 27.84 kg/m        Wt Readings from Last 5 Encounters:   19 194 lb (88 kg)   11/15/18 201 lb 9.6 oz (91.4 kg)   18 197  lb (89.4 kg)   08/26/18 202 lb 8 oz (91.9 kg)   04/02/14 238 lb 3.2 oz (108 kg)     General Appearance: Alert, awake, cooperative, pleasant, NAD  Gait and tandem gait: steady  Attention Span:  Normal  Language/speech: no aphasia or dysarthria  Extraocular Movements:  Normal  Coordination:  Normal FNF  Facial Sensation:  Normal  Facial Strength:  Normal  Tongue Strength:  Normal  Motor Exam: normal tone, bulk and strength 5/5 bilaterally  Limb Sensation:  Normal  DTRs: 2+ symmetric, toes downgoing    Assessment and Plan:    Temporal lobe epilepsy: seizures were controlled for a long time and he was off medication. They recurred and he was restarted on lamotrigine. No seizures on lamotrigine. He is taking 200 mg bid. Denies side effects.     - Continue lamotrigine 200 mg bid.   - Obtain lamotrigine level for efficacy.  - RTC in 6 months.       As described above, I met with the patient for 25 minutes and during this time counseling was greater than 50% of the visit time.  Shira Sanchez MD                    Again, thank you for allowing me to participate in the care of your patient.      Sincerely,    Shira Sanchez MD

## 2019-02-20 LAB — LAMOTRIGINE SERPL-MCNC: 3.1 UG/ML (ref 2.5–15)

## 2019-04-01 DIAGNOSIS — G40.109 TEMPORAL LOBE EPILEPSY (H): ICD-10-CM

## 2019-04-01 RX ORDER — LAMOTRIGINE 100 MG/1
TABLET ORAL
Qty: 120 TABLET | Refills: 2 | Status: SHIPPED | OUTPATIENT
Start: 2019-04-01 | End: 2019-06-28

## 2019-06-28 DIAGNOSIS — G40.109 TEMPORAL LOBE EPILEPSY (H): Primary | ICD-10-CM

## 2019-06-28 RX ORDER — LAMOTRIGINE 100 MG/1
TABLET ORAL
Qty: 360 TABLET | Refills: 0 | Status: SHIPPED | OUTPATIENT
Start: 2019-06-28 | End: 2019-08-12

## 2019-06-28 NOTE — TELEPHONE ENCOUNTER
Last Written Prescription Date:4/1/19  Last Fill Quantity: 360  Last office visit: 2/18/19  Future Office Visit: f/u 1 rivka

## 2019-08-12 ENCOUNTER — OFFICE VISIT (OUTPATIENT)
Dept: NEUROLOGY | Facility: CLINIC | Age: 34
End: 2019-08-12
Payer: COMMERCIAL

## 2019-08-12 VITALS
SYSTOLIC BLOOD PRESSURE: 139 MMHG | DIASTOLIC BLOOD PRESSURE: 77 MMHG | WEIGHT: 190.6 LBS | BODY MASS INDEX: 27.35 KG/M2 | HEART RATE: 67 BPM

## 2019-08-12 DIAGNOSIS — G40.109 FOCAL (MOTOR) EPILEPSY (H): Primary | ICD-10-CM

## 2019-08-12 DIAGNOSIS — G40.109 TEMPORAL LOBE EPILEPSY (H): ICD-10-CM

## 2019-08-12 RX ORDER — LAMOTRIGINE 100 MG/1
200 TABLET ORAL 2 TIMES DAILY
Qty: 360 TABLET | Refills: 3 | Status: SHIPPED | OUTPATIENT
Start: 2019-08-12 | End: 2020-10-21

## 2019-08-12 ASSESSMENT — PAIN SCALES - GENERAL: PAINLEVEL: NO PAIN (0)

## 2019-08-12 NOTE — LETTER
"2019       RE: Fred Alva  : 1985   MRN: [id number]      Dear Colleague,    Thank you for referring your patient, Fred Alva, to the [facility] CARE at [facility]. Please see a copy of my visit note below.    [facility] Epilepsy Care Progress Note      Patient:  Fred Alva  :  1985   Age:  33 year old   Today's Office Visit:  2019    Epilepsy Data:    Patient History  Primary Epileptologist/Provider: Shira Sanchez M.D.  Epilepsy Syndrome: Temporal lobe epilepsy  Age of Onset: 14 years        Seizure Record  Current Visit Date: 19  Previous Visit Date: (P) 19  Months since last visit: (P) 5.75  Seizure Type 1: Simple partial seizures with sensory symptoms  Description of Sz Type 1: aura, consist of a crescendo sound, which lasts approximately 3 seconds, and he has this \"roller coaster feeling.  Seizure Type 2: Simple partial onset followed by impairment of consciousness  Description of Sz Type 2: They start with an aura, consist of a crescendo sound, which lasts approximately 3 seconds, and he has this \"roller coaster feeling,\" then he would blank out for 10-15 seconds.  Friends have witnessed fingers or toes twitching, then he would be confused for a few minutes.  Seizure Type 3: Tonic-clonic seizures    History of Present Illness:    Fred is here for a follow up on his seizures.  He did not have any seizures since last visit in 2019.  He is taking lamotrigine 200 mg twice a day.  He has been compliant with taking medications last lamotrigine level was 3.2.  Patient complains of occasional dizziness which is not positional, also complains of some insomnia which resolved with melatonin.  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      Current Outpatient Medications   Medication Sig Dispense " Refill     lamoTRIgine (LAMICTAL) 100 MG tablet TAKE 1/2 (ONE-HALF) TABLET BY MOUTH 2 TIMES PER DAY. INCREASE AS DIRECTED UP TO 2 TABS TWICE A DAY. 360 tablet 0      Results for STELLA SWAIN (MRN [id number]) as of 8/12/2019 09:18   Ref. Range 2/18/2019 12:38   Lamotrigine Level Latest Ref Range: 2.5 - 15.0 ug/mL 3.1     Medication Notes:       AED Medication Compliance:  compliant most of the time  Using a pill box:  No    Review of Systems:  Lethargy / Tiredness:  No  Nausea / Vomiting:  No  Double Vision:  No  Sleepiness:  Yes  Depression:  No  Slowed Cognitive Function:  No  Memory Problems:  Yes  Poor Balance:  No  Dizziness: Sometimes gets dizzy.   Appetite Changes:  No  Blurred Vision:  No  Sleep Changes: yes  Behavioral Changes:  No  Skin: negative  Respiratory: No shortness of breath and No cough  Cardiovascular: negative  Have you experienced a traumatic fall since your last visit: NO    Other Issues:    Is patient safe to drive:  Yes    Exam:    /77 (BP Location: Left arm, Patient Position: Sitting, Cuff Size: Adult Regular)   Pulse 67   Wt 190 lb 9.6 oz (86.5 kg)   BMI 27.35 kg/m        Wt Readings from Last 5 Encounters:   08/12/19 190 lb 9.6 oz (86.5 kg)   02/18/19 194 lb (88 kg)   11/15/18 201 lb 9.6 oz (91.4 kg)   11/06/18 197 lb (89.4 kg)   08/26/18 202 lb 8 oz (91.9 kg)     General Appearance: Alert, awake, cooperative, pleasant, NAD  Gait and tandem gait: steady  Attention Span:  Normal  Language/speech: no aphasia or dysarthria  Extraocular Movements:  Normal  Coordination:  Normal FNF  Facial Sensation:  Normal  Facial Strength:  Normal  Tongue Strength:  Normal  Motor Exam: normal tone, bulk and strength 5/5 bilaterally    Assessment and Plan:    Temporal lobe epilepsy: seizures were controlled for a long time and he was off medication. Seizures recurred and he was restarted on lamotrigine. No seizures on lamotrigine. He is taking 200 mg bid.  He has occasional dizziness and  insomnia, melatonin helps with sleep currently.  He also complains of memory loss.  I recommended the patient takes her lamotrigine earlier in the afternoon to help with insomnia.  I also advised to take his lamotrigine with food to decrease the dizziness blurred vision and other side effects.  His memory problems can be multifactorial including underlying etiology for his epilepsy especially temporal lobe epilepsy as temporal lobe controls memory.  His lamotrigine level is low therapeutic and I doubt that it caused memory problems.    -Continue taking lamotrigine 200 mg twice daily.  (Take evening dose earlier in the afternoon)    -Obtain lamotrigine level for efficacy and compliance.    -Return to clinic in 6-month        As described above, I met with the patient for 25 minutes and during this time counseling was greater than 50% of the visit time.  Shira Sanchez MD                    Again, thank you for allowing me to participate in the care of your patient.      Sincerely,    Shira Sanchez MD

## 2019-08-12 NOTE — PROGRESS NOTES
"[facility]      Patient:  Stella Swain  :  1985   Age:  33 year old   Today's Office Visit:  2019    Epilepsy Data:    Patient History  Primary Epileptologist/Provider: Shira Sanchez M.D.  Epilepsy Syndrome: Temporal lobe epilepsy  Age of Onset: 14 years        Seizure Record  Current Visit Date: 19  Previous Visit Date: (P) 19  Months since last visit: (P) 5.75  Seizure Type 1: Simple partial seizures with sensory symptoms  Description of Sz Type 1: aura, consist of a crescendo sound, which lasts approximately 3 seconds, and he has this \"roller coaster feeling.  Seizure Type 2: Simple partial onset followed by impairment of consciousness  Description of Sz Type 2: They start with an aura, consist of a crescendo sound, which lasts approximately 3 seconds, and he has this \"roller coaster feeling,\" then he would blank out for 10-15 seconds.  Friends have witnessed fingers or toes twitching, then he would be confused for a few minutes.  Seizure Type 3: Tonic-clonic seizures    History of Present Illness:    Stella is here for a follow up on his seizures.  He did not have any seizures since last visit in 2019.  He is taking lamotrigine 200 mg twice a day.  He has been compliant with taking medications last lamotrigine level was 3.2.  Patient complains of occasional dizziness which is not positional, also complains of some insomnia which resolved with melatonin.  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      Current Outpatient Medications   Medication Sig Dispense Refill     lamoTRIgine (LAMICTAL) 100 MG tablet TAKE 1/2 (ONE-HALF) TABLET BY MOUTH 2 TIMES PER DAY. INCREASE AS DIRECTED UP TO 2 TABS TWICE A DAY. 360 tablet 0      Results for STELLA SWAIN (MRN [id number]) as of 2019 09:18   Ref. Range 2019 12:38 "   Lamotrigine Level Latest Ref Range: 2.5 - 15.0 ug/mL 3.1     Medication Notes:       AED Medication Compliance:  compliant most of the time  Using a pill box:  No    Review of Systems:  Lethargy / Tiredness:  No  Nausea / Vomiting:  No  Double Vision:  No  Sleepiness:  Yes  Depression:  No  Slowed Cognitive Function:  No  Memory Problems:  Yes  Poor Balance:  No  Dizziness: Sometimes gets dizzy.   Appetite Changes:  No  Blurred Vision:  No  Sleep Changes: yes  Behavioral Changes:  No  Skin: negative  Respiratory: No shortness of breath and No cough  Cardiovascular: negative  Have you experienced a traumatic fall since your last visit: NO    Other Issues:    Is patient safe to drive:  Yes    Exam:    /77 (BP Location: Left arm, Patient Position: Sitting, Cuff Size: Adult Regular)   Pulse 67   Wt 190 lb 9.6 oz (86.5 kg)   BMI 27.35 kg/m       Wt Readings from Last 5 Encounters:   08/12/19 190 lb 9.6 oz (86.5 kg)   02/18/19 194 lb (88 kg)   11/15/18 201 lb 9.6 oz (91.4 kg)   11/06/18 197 lb (89.4 kg)   08/26/18 202 lb 8 oz (91.9 kg)     General Appearance: Alert, awake, cooperative, pleasant, NAD  Gait and tandem gait: steady  Attention Span:  Normal  Language/speech: no aphasia or dysarthria  Extraocular Movements:  Normal  Coordination:  Normal FNF  Facial Sensation:  Normal  Facial Strength:  Normal  Tongue Strength:  Normal  Motor Exam: normal tone, bulk and strength 5/5 bilaterally    Assessment and Plan:    Temporal lobe epilepsy: seizures were controlled for a long time and he was off medication. Seizures recurred and he was restarted on lamotrigine. No seizures on lamotrigine. He is taking 200 mg bid.  He has occasional dizziness and insomnia, melatonin helps with sleep currently.  He also complains of memory loss.  I recommended the patient takes her lamotrigine earlier in the afternoon to help with insomnia.  I also advised to take his lamotrigine with food to decrease the dizziness blurred vision  and other side effects.  His memory problems can be multifactorial including underlying etiology for his epilepsy especially temporal lobe epilepsy as temporal lobe controls memory.  His lamotrigine level is low therapeutic and I doubt that it caused memory problems.    -Continue taking lamotrigine 200 mg twice daily.  (Take evening dose earlier in the afternoon)    -Obtain lamotrigine level for efficacy and compliance.    -Return to clinic in 6-month        As described above, I met with the patient for 25 minutes and during this time counseling was greater than 50% of the visit time.  Shira Sanchez MD

## 2020-01-16 DIAGNOSIS — G40.109 FOCAL (MOTOR) EPILEPSY (H): ICD-10-CM

## 2020-01-16 PROCEDURE — 80175 DRUG SCREEN QUAN LAMOTRIGINE: CPT | Mod: 90 | Performed by: PSYCHIATRY & NEUROLOGY

## 2020-01-16 PROCEDURE — 99000 SPECIMEN HANDLING OFFICE-LAB: CPT | Performed by: PSYCHIATRY & NEUROLOGY

## 2020-01-17 LAB — LAMOTRIGINE SERPL-MCNC: 5.4 UG/ML (ref 2.5–15)

## 2020-02-13 ENCOUNTER — OFFICE VISIT (OUTPATIENT)
Dept: NEUROLOGY | Facility: CLINIC | Age: 35
End: 2020-02-13
Payer: COMMERCIAL

## 2020-02-13 VITALS
BODY MASS INDEX: 29.47 KG/M2 | SYSTOLIC BLOOD PRESSURE: 157 MMHG | WEIGHT: 205.4 LBS | HEART RATE: 96 BPM | DIASTOLIC BLOOD PRESSURE: 102 MMHG

## 2020-02-13 DIAGNOSIS — G40.109 FOCAL EPILEPSY (H): Primary | ICD-10-CM

## 2020-02-13 ASSESSMENT — PAIN SCALES - GENERAL: PAINLEVEL: NO PAIN (0)

## 2020-02-13 NOTE — LETTER
"2020       RE: Fred Alva  : 1985   MRN: [id number]      Dear Colleague,    Thank you for referring your patient, Fred Alva, to the [facility] EPILEPSY CARE at [facility]. Please see a copy of my visit note below.    [facility] Epilepsy Care Progress Note      Patient:  Fred Alva  :  1985   Age:  34 year old   Today's Office Visit:  20    Epilepsy Data:    Patient History  Primary Epileptologist/Provider: Shira Sanchez M.D.  Epilepsy Syndrome: Temporal lobe epilepsy  Age of Onset: 14 years        Seizure Record  Current Visit Date: 20  Previous Visit Date: (P) 19  Months since last visit: (P) 6 months  Seizure Type 1: Simple partial seizures with sensory symptoms  Description of Sz Type 1: aura, consist of a crescendo sound, which lasts approximately 3 seconds, and he has this \"roller coaster feeling.  Seizure Type 2: Simple partial onset followed by impairment of consciousness  Description of Sz Type 2: They start with an aura, consist of a crescendo sound, which lasts approximately 3 seconds, and he has this \"roller coaster feeling,\" then he would blank out for 10-15 seconds.  Friends have witnessed fingers or toes twitching, then he would be confused for a few minutes.  Seizure Type 3: Tonic-clonic seizures    History of Present Illness:  Fred is here for a follow up on his seizures.  He does report one spell since last visit in 2019.  He descirbes this as occurring at work \"a few days before Thanks.\" He was bending over to plug some things in when he experienced dizziness and felt that the room was spinning for about 5 seconds. Fred denies any motor symptoms and there were witnesses to confirm this. This is the only event he reports. He is taking lamotrigine 200 mg twice a day when he wakes (about 5AM) and just before bed (about 11pm).  He has been compliant with taking medications missing about 1 dose " per month. Last lamotrigine level was therapeutic.  Patient complains of occasional dizziness which is not positional, also complains of some insomnia which resolved with melatonin.  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     Current Outpatient Medications   Medication Sig Dispense Refill     lamoTRIgine (LAMICTAL) 100 MG tablet Take 2 tablets (200 mg) by mouth 2 times daily 360 tablet 3      Results for STELLA SWAIN (MRN [id number]) as of 8/12/2019 09:18   Ref. Range 01/16/2020 07:54   Lamotrigine Level Latest Ref Range: 2.5 - 15.0 ug/mL 5.4     Medication Notes:       AED Medication Compliance: Yes  Using a pill box: No    Review of Systems:  Lethargy / Tiredness:  No  Nausea / Vomiting:  No  Double Vision:  No  Sleepiness:  Yes  Depression:  No  Slowed Cognitive Function: No  Memory Problems: Yes  Poor Balance:  No  Dizziness: Yes  Appetite Changes: No  Blurred Vision:  No  Sleep Changes: No  Behavioral Changes:  No  Skin: No  Respiratory: No  Cardiovascular: No  Have you experienced a traumatic fall since your last visit: No    Other Issues:    Is patient safe to drive:  Yes     Exam:    There were no vitals taken for this visit.     Wt Readings from Last 5 Encounters:   08/12/19 86.5 kg (190 lb 9.6 oz)   02/18/19 88 kg (194 lb)   11/15/18 91.4 kg (201 lb 9.6 oz)   11/06/18 89.4 kg (197 lb)   08/26/18 91.9 kg (202 lb 8 oz)     General Appearance: Alert, awake, cooperative, pleasant, NAD  Gait and tandem gait: steady  Attention Span:  Normal  Language/speech: no aphasia or dysarthria  Extraocular Movements:  Normal  Coordination:  Normal FNF  Facial Sensation:  Normal  Facial Strength:  Normal  Tongue Strength:  Normal  Motor Exam: normal tone, bulk and strength 5/5 bilaterally    Assessment and Plan:  Temporal lobe epilepsy: seizures have been controlled with exception of a short time when he was off medication. He has  since been on lamotrigine 200 BID with no seizures. He has occasional dizziness and insomnia, melatonin helps with sleep currently. We recommended the patient takes his lamotrigine earlier in the afternoon to help with insomnia though he has not been doing this because his schedule doesn't allow. His memory problems can be multifactorial including the etiology for his epilepsy (temporal lobe controls memory).  His lamotrigine level is low therapeutic.    -Continue lamotrigine 200 mg twice daily  -Return to clinic in 6-month      Fred Rivas MS4  [facility] [employer]    I was present with the medical student who participated in the service and in the documentation of the note. I have verified the history and personally performed the physical exam and medical decision making. I agree with the assessment and plan of care as documented in the note.  Shira Sanchez MD                      Again, thank you for allowing me to participate in the care of your patient.      Sincerely,    Shira Sanchez MD

## 2020-02-13 NOTE — PROGRESS NOTES
"[facility] [place] [facility]      Patient:  Fred Alva  :  1985   Age:  34 year old   Today's Office Visit:  20    Epilepsy Data:    Patient History  Primary Epileptologist/Provider: Shira Sanchez M.D.  Epilepsy Syndrome: Temporal lobe epilepsy  Age of Onset: 14 years        Seizure Record  Current Visit Date: 20  Previous Visit Date: (P) 19  Months since last visit: (P) 6 months  Seizure Type 1: Simple partial seizures with sensory symptoms  Description of Sz Type 1: aura, consist of a crescendo sound, which lasts approximately 3 seconds, and he has this \"roller coaster feeling.  Seizure Type 2: Simple partial onset followed by impairment of consciousness  Description of Sz Type 2: They start with an aura, consist of a crescendo sound, which lasts approximately 3 seconds, and he has this \"roller coaster feeling,\" then he would blank out for 10-15 seconds.  Friends have witnessed fingers or toes twitching, then he would be confused for a few minutes.  Seizure Type 3: Tonic-clonic seizures    History of Present Illness:  Fred is here for a follow up on his seizures.  He does report one spell since last visit in 2019.  He descirbes this as occurring at work \"a few days before .\" He was bending over to plug some things in when he experienced dizziness and felt that the room was spinning for about 5 seconds. Fred denies any motor symptoms and there were witnesses to confirm this. This is the only event he reports. He is taking lamotrigine 200 mg twice a day when he wakes (about 5AM) and just before bed (about 11pm).  He has been compliant with taking medications missing about 1 dose per month. Last lamotrigine level was therapeutic.  Patient complains of occasional dizziness which is not positional, also complains of some insomnia which resolved with melatonin.  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     Current Outpatient Medications   Medication Sig Dispense Refill     lamoTRIgine (LAMICTAL) 100 MG tablet Take 2 tablets (200 mg) by mouth 2 times daily 360 tablet 3      Results for STELLA SWAIN (MRN [id number]) as of 8/12/2019 09:18   Ref. Range 01/16/2020 07:54   Lamotrigine Level Latest Ref Range: 2.5 - 15.0 ug/mL 5.4     Medication Notes:       AED Medication Compliance: Yes  Using a pill box: No    Review of Systems:  Lethargy / Tiredness:  No  Nausea / Vomiting:  No  Double Vision:  No  Sleepiness:  Yes  Depression:  No  Slowed Cognitive Function: No  Memory Problems: Yes  Poor Balance:  No  Dizziness: Yes  Appetite Changes: No  Blurred Vision:  No  Sleep Changes: No  Behavioral Changes:  No  Skin: No  Respiratory: No  Cardiovascular: No  Have you experienced a traumatic fall since your last visit: No    Other Issues:    Is patient safe to drive:  Yes     Exam:    There were no vitals taken for this visit.     Wt Readings from Last 5 Encounters:   08/12/19 86.5 kg (190 lb 9.6 oz)   02/18/19 88 kg (194 lb)   11/15/18 91.4 kg (201 lb 9.6 oz)   11/06/18 89.4 kg (197 lb)   08/26/18 91.9 kg (202 lb 8 oz)     General Appearance: Alert, awake, cooperative, pleasant, NAD  Gait and tandem gait: steady  Attention Span:  Normal  Language/speech: no aphasia or dysarthria  Extraocular Movements:  Normal  Coordination:  Normal FNF  Facial Sensation:  Normal  Facial Strength:  Normal  Tongue Strength:  Normal  Motor Exam: normal tone, bulk and strength 5/5 bilaterally    Assessment and Plan:  Temporal lobe epilepsy: seizures have been controlled with exception of a short time when he was off medication. He has since been on lamotrigine 200 BID with no seizures. He has occasional dizziness and insomnia, melatonin helps with sleep currently. We recommended the patient takes his lamotrigine earlier in the afternoon to help with insomnia though he has not been doing this because his  schedule doesn't allow. His memory problems can be multifactorial including the etiology for his epilepsy (temporal lobe controls memory).  His lamotrigine level is low therapeutic.    -Continue lamotrigine 200 mg twice daily  -Return to clinic in 6-month      Fred Rivas MS4  [employer] [facility] [employer]    I was present with the medical student who participated in the service and in the documentation of the note. I have verified the history and personally performed the physical exam and medical decision making. I agree with the assessment and plan of care as documented in the note.  Shira Sanchez MD

## 2020-02-16 ENCOUNTER — HEALTH MAINTENANCE LETTER (OUTPATIENT)
Age: 35
End: 2020-02-16

## 2020-09-10 ENCOUNTER — VIRTUAL VISIT (OUTPATIENT)
Dept: NEUROLOGY | Facility: CLINIC | Age: 35
End: 2020-09-10
Payer: COMMERCIAL

## 2020-09-10 DIAGNOSIS — G40.109 FOCAL EPILEPSY (H): Primary | ICD-10-CM

## 2020-09-10 RX ORDER — LAMOTRIGINE 200 MG/1
200 TABLET ORAL DAILY
Qty: 180 TABLET | Refills: 3 | Status: SHIPPED | OUTPATIENT
Start: 2020-09-10 | End: 2021-12-21

## 2020-09-10 ASSESSMENT — PATIENT HEALTH QUESTIONNAIRE - PHQ9: SUM OF ALL RESPONSES TO PHQ QUESTIONS 1-9: 2

## 2020-09-10 NOTE — PROGRESS NOTES
"Fred Alva is a 34 year old male who is being evaluated via a billable video visit.      The patient has been notified of following:     \"This video visit will be conducted via a call between you and your physician/provider. We have found that certain health care needs can be provided without the need for an in-person physical exam.  This service lets us provide the care you need with a video conversation.  If a prescription is necessary we can send it directly to your pharmacy.  If lab work is needed we can place an order for that and you can then stop by our lab to have the test done at a later time.    Video visits are billed at different rates depending on your insurance coverage.  Please reach out to your insurance provider with any questions.    If during the course of the call the physician/provider feels a video visit is not appropriate, you will not be charged for this service.\"    Patient has given verbal consent for Video visit? Yes  How would you like to obtain your AVS? MyChart  If you are dropped from the video visit, the video invite should be resent to: Send to e-mail at: [email or web address]  Will anyone else be joining your video visit? No        Video-Visit Details    Type of service:  Video Visit    Video Start Time: 9:12  Video End Time: 9:22    Originating Location (pt. Location): Home    Distant Location (provider location):  [facility] EPILEPSY CARE     Platform used for Video Visit: [facility] Epilepsy Care Progress Note      Patient:  Fred Alva  :  1985   Age:  34 year old   Today's Office Visit:  9/10/2020    Epilepsy Data:    Patient History  Primary Epileptologist/Provider: Shira Sanchez M.D.  Epilepsy Syndrome: Temporal lobe epilepsy  Age of Onset: 14 years          Seizure Record  Current Visit Date: 09/10/20  Previous Visit Date: (P) 20  Months since last visit: (P) 6.9  Seizure Type 1: Simple partial seizures with sensory symptoms  Description " "of Sz Type 1: aura, consist of a crescendo sound, which lasts approximately 3 seconds, and he has this \"roller coaster feeling.  Seizure Type 2: Simple partial onset followed by impairment of consciousness  Description of Sz Type 2: They start with an aura, consist of a crescendo sound, which lasts approximately 3 seconds, and he has this \"roller coaster feeling,\" then he would blank out for 10-15 seconds.  Friends have witnessed fingers or toes twitching, then he would be confused for a few minutes.  Seizure Type 3: Tonic-clonic seizures    History of Present Illness:    Stella is participating in this virtual visit for a follow up on his seizures. He was last seen in Feb 2020. He did not have any seizures since then. He is taking lamotrigine 200 mg bid. Denies dizziness, imbalance, double/blurred vision. He has trouble falling asleep, taking melatonin often. He is taking his lamotrigine usually between 8-9 pm.   Social: He works for a [employer]. He works from home. Every other week he goes to work for a few hours.    Current Outpatient Medications   Medication Sig Dispense Refill     lamoTRIgine (LAMICTAL) 100 MG tablet Take 2 tablets (200 mg) by mouth 2 times daily 360 tablet 3      Results for STELLA SWAIN (MRN [id number]) as of 9/10/2020 09:10   Ref. Range 1/16/2020 07:54   Lamotrigine Level Latest Ref Range: 2.5 - 15.0 ug/mL 5.4     Medication Notes:        AED Medication Compliance:  compliant most of the time    Review of Systems:  Lethargy / Tiredness:  No  Nausea / Vomiting:  No  Double Vision:  No  Sleepiness:  No  Depression:  No  Slowed Cognitive Function:  No  Memory Problems:  No  Poor Balance:  No  Dizziness:  No  Appetite Changes:  No  Blurred Vision:  No  Have you experienced a traumatic fall since your last visit: No    Other Issues:    Is patient safe to drive:  Yes    Exam:    There were no vitals taken for this visit.     Wt Readings from Last 5 Encounters:   02/13/20 205 lb 6.4 oz " (93.2 kg)   08/12/19 190 lb 9.6 oz (86.5 kg)   02/18/19 194 lb (88 kg)   11/15/18 201 lb 9.6 oz (91.4 kg)   11/06/18 197 lb (89.4 kg)     Alert and oriented, no aphasia or dysarthria, EOMI, face symmetric, moves upper ext against gravity, normal finger to nose.    Assessment and Plan:    Temporal lobe epilepsy: seizures have been controlled on lamotrigine monotherapy. He has some insomnia. I advised him to take his lamotrigine earlier in the afternoon, because taking it late close to bedtime, may disrupt sleep. He wants his pills change from 100 mg to 200 mg.     - Continue lamotrigine 200 mg twice a day, take evening dose earlier.    - RTC in 6 months    As described above, I met with the patient for 10 minutes and during this time counseling was within 50% of the visit time.  Shira Sanchez MD

## 2020-09-10 NOTE — LETTER
"9/10/2020       RE: Fred Alva  : 1985   MRN: [id number]      Dear Colleague,    Thank you for referring your patient, Fred Alva, to the [facility] EPILEPSY CARE at [facility]. Please see a copy of my visit note below.    Left message to call back and review questions before appointment today.     Fred Alva is a 34 year old male who is being evaluated via a billable video visit.      The patient has been notified of following:     \"This video visit will be conducted via a call between you and your physician/provider. We have found that certain health care needs can be provided without the need for an in-person physical exam.  This service lets us provide the care you need with a video conversation.  If a prescription is necessary we can send it directly to your pharmacy.  If lab work is needed we can place an order for that and you can then stop by our lab to have the test done at a later time.    Video visits are billed at different rates depending on your insurance coverage.  Please reach out to your insurance provider with any questions.    If during the course of the call the physician/provider feels a video visit is not appropriate, you will not be charged for this service.\"    Patient has given verbal consent for Video visit? Yes  How would you like to obtain your AVS? MyChart  If you are dropped from the video visit, the video invite should be resent to: Send to e-mail at: [email or web address]  Will anyone else be joining your video visit? No        Video-Visit Details    Type of service:  Video Visit    Video Start Time: 9:12  Video End Time: 9:22    Originating Location (pt. Location): Home    Distant Location (provider location):  [facility] EPILEPSY CARE     Platform used for Video Visit: [facility] Epilepsy Care Progress Note      Patient:  Fred Alva  :  1985   Age:  34 year old   Today's Office Visit:  9/10/2020    Epilepsy " "Data:    Patient History  Primary Epileptologist/Provider: Shira Sanchez M.D.  Epilepsy Syndrome: Temporal lobe epilepsy  Age of Onset: 14 years          Seizure Record  Current Visit Date: 09/10/20  Previous Visit Date: (P) 02/13/20  Months since last visit: (P) 6.9  Seizure Type 1: Simple partial seizures with sensory symptoms  Description of Sz Type 1: aura, consist of a crescendo sound, which lasts approximately 3 seconds, and he has this \"roller coaster feeling.  Seizure Type 2: Simple partial onset followed by impairment of consciousness  Description of Sz Type 2: They start with an aura, consist of a crescendo sound, which lasts approximately 3 seconds, and he has this \"roller coaster feeling,\" then he would blank out for 10-15 seconds.  Friends have witnessed fingers or toes twitching, then he would be confused for a few minutes.  Seizure Type 3: Tonic-clonic seizures    History of Present Illness:    Stella is participating in this virtual visit for a follow up on his seizures. He was last seen in Feb 2020. He did not have any seizures since then. He is taking lamotrigine 200 mg bid. Denies dizziness, imbalance, double/blurred vision. He has trouble falling asleep, taking melatonin often. He is taking his lamotrigine usually between 8-9 pm.   Social: He works for a [employer]. He works from home. Every other week he goes to work for a few hours.    Current Outpatient Medications   Medication Sig Dispense Refill     lamoTRIgine (LAMICTAL) 100 MG tablet Take 2 tablets (200 mg) by mouth 2 times daily 360 tablet 3      Results for STELLA SWAIN (MRN [id number]) as of 9/10/2020 09:10   Ref. Range 1/16/2020 07:54   Lamotrigine Level Latest Ref Range: 2.5 - 15.0 ug/mL 5.4     Medication Notes:        AED Medication Compliance:  compliant most of the time    Review of Systems:  Lethargy / Tiredness:  No  Nausea / Vomiting:  No  Double Vision:  No  Sleepiness:  No  Depression:  No  Slowed Cognitive " Function:  No  Memory Problems:  No  Poor Balance:  No  Dizziness:  No  Appetite Changes:  No  Blurred Vision:  No  Have you experienced a traumatic fall since your last visit: No    Other Issues:    Is patient safe to drive:  Yes    Exam:    There were no vitals taken for this visit.     Wt Readings from Last 5 Encounters:   02/13/20 205 lb 6.4 oz (93.2 kg)   08/12/19 190 lb 9.6 oz (86.5 kg)   02/18/19 194 lb (88 kg)   11/15/18 201 lb 9.6 oz (91.4 kg)   11/06/18 197 lb (89.4 kg)     Alert and oriented, no aphasia or dysarthria, EOMI, face symmetric, moves upper ext against gravity, normal finger to nose.    Assessment and Plan:    Temporal lobe epilepsy: seizures have been controlled on lamotrigine monotherapy. He has some insomnia. I advised him to take his lamotrigine earlier in the afternoon, because taking it late close to bedtime, may disrupt sleep. He wants his pills change from 100 mg to 200 mg.     - Continue lamotrigine 200 mg twice a day, take evening dose earlier.    - RTC in 6 months    As described above, I met with the patient for 10 minutes and during this time counseling was within 50% of the visit time.  Shira Sanchez MD                    Again, thank you for allowing me to participate in the care of your patient.      Sincerely,    Shira Sanchez MD

## 2020-10-19 DIAGNOSIS — G40.109 TEMPORAL LOBE EPILEPSY (H): ICD-10-CM

## 2020-10-21 NOTE — TELEPHONE ENCOUNTER
Requested  lamoTRIgine (LAMICTAL) 100 MG tablet  Take 2 tablets (200 mg) by mouth    Current med list  lamoTRIgine (LAMICTAL) 100 MG tablet  Take 2 tablets (200 mg) by mouth 2 times daily     Last Written Prescription Date:  8-12-19  Last Fill Quantity: 360,   # refills: 3    Also on med list:  lamoTRIgine (LAMICTAL) 200 MG tablet  Take 1 tablet (200 mg) by mouth daily  Last fill 9-10-20 for 180 tab w/3RF      Last Office Visit : 9-10-20  Future Office visit:  None        Assessment and Plan:     Temporal lobe epilepsy: seizures have been controlled on lamotrigine monotherapy. He has some insomnia. I advised him to take his lamotrigine earlier in the afternoon, because taking it late close to bedtime, may disrupt sleep. He wants his pills change from 100 mg to 200 mg.      - Continue lamotrigine 200 mg twice a day, take evening dose earlier.     Routing refill request to provider for review/approval because:  Clarify dosage, discontinue any meds not current.      Filling per [facility] medication refill protocols - seizure medications.  Not all labs required.

## 2020-10-23 RX ORDER — LAMOTRIGINE 100 MG/1
200 TABLET ORAL 2 TIMES DAILY
Qty: 360 TABLET | Refills: 3 | Status: SHIPPED | OUTPATIENT
Start: 2020-10-23 | End: 2021-12-21

## 2020-10-29 ENCOUNTER — TELEPHONE (OUTPATIENT)
Dept: NEUROLOGY | Facility: CLINIC | Age: 35
End: 2020-10-29

## 2021-04-06 ENCOUNTER — TELEPHONE (OUTPATIENT)
Dept: NEUROLOGY | Facility: CLINIC | Age: 36
End: 2021-04-06

## 2021-04-10 ENCOUNTER — HEALTH MAINTENANCE LETTER (OUTPATIENT)
Age: 36
End: 2021-04-10

## 2021-05-31 VITALS — WEIGHT: 218 LBS | HEIGHT: 69 IN | BODY MASS INDEX: 32.29 KG/M2

## 2021-06-14 NOTE — PROGRESS NOTES
Assessment/Plan:    Diagnoses and all orders for this visit:    Routine general medical examination at a health care facility  -     Lipid [place] FASTING; Future; Expected date: 11/17/17  -     Comprehensive Metabolic Panel; Future; Expected date: 11/17/17  -     Glycosylated Hemoglobin A1c; Future; Expected date: 11/17/17    Epilepsy - Seizure free since 2009.  Off medication since 2013.  Paperwork completed.    Acute otitis media, left  -     amoxicillin (AMOXIL) 500 MG capsule; Take 1 capsule (500 mg total) by mouth 3 (three) times a day for 10 days.  Dispense: 30 capsule; Refill: 0    Need for vaccination  -     Influenza, Seasonal Quad, Preservative Free 36+ Months; Future; Expected date: 11/17/17  -     Td, Adult, Adsorbed; Future; Expected date: 11/17/17      Patient Instructions   1) Cleared to drive a motor vehicle.  Form completed.  2) Amoxicillin 500 mg twice daily for 10 days.  3) Make appointment at any [facility] facility for flu shot, tetanus shot, and fasting labs.       Subjective:   Fred Alva is a 32 y.o. male who presents today requesting paperwork be filled out for driving.  The patient has a diagnosis of epilepsy.  He was diagnosed with complex partial seizures as a teenager and had seizures into his 20s.  His last seizure was in 2009.  He was able to wean off his lamotrigine in 2013 and has remained seizure free.  I reviewed previous records from [facility] and they confirmed this information.  [facility] database also reviewed and no prescriptions listed.    The patient is also complaining of left sided ear pain.  He had a recent URI and symptoms started after that.      The patient is also due for a general physical.    Patient Active Problem List   Diagnosis     Kidney stones     Epilepsy        Past Medical History:   Diagnosis Date     Anxiety disorder childhood        Past Surgical History:   Procedure Laterality Date     SHOULDER SURGERY Left High school     TONSILLECTOMY        "WISDOM TOOTH EXTRACTION         Current Outpatient Prescriptions:   None      Review of Systems   Constitutional: Negative for activity change, appetite change, fever and unexpected weight change.   HENT: Positive for congestion (mild cold symptoms) and ear pain (left). Negative for hearing loss and sore throat.    Eyes: Negative for discharge and visual disturbance.   Respiratory: Negative for cough, shortness of breath and wheezing.    Cardiovascular: Negative for chest pain, palpitations and leg swelling.   Gastrointestinal: Negative for abdominal pain, blood in stool, constipation, diarrhea and vomiting.   Endocrine: Negative for polydipsia and polyuria.   Genitourinary: Negative for decreased urine volume, difficulty urinating, dysuria, frequency, hematuria and urgency.   Musculoskeletal: Negative for arthralgias, gait problem and myalgias.   Skin: Negative for rash.   Allergic/Immunologic: Negative for immunocompromised state.   Neurological: Negative for weakness.   Hematological: Does not bruise/bleed easily.   Psychiatric/Behavioral: Negative for dysphoric mood and sleep disturbance. The patient is not nervous/anxious.         Objective:     Vitals:    11/17/17 0743   BP: 126/80   Patient Site: Left Arm   Patient Position: Sitting   Cuff Size: Adult Regular   Pulse: (!) 105   Resp: 16   Temp: 98.7  F (37.1  C)   TempSrc: Oral   SpO2: 98%   Weight: 218 lb (98.9 kg)   Height: 5' 9\" (1.753 m)        Physical Exam   Constitutional: He is oriented to person, place, and time. He appears well-developed and well-nourished. No distress.   HENT:   Right Ear: Tympanic membrane and ear canal normal.   Left Ear: Ear canal normal. Tympanic membrane is bulging. Tympanic membrane is not erythematous. Tympanic membrane mobility is abnormal. A middle ear effusion is present.   Mouth/Throat: Oropharynx is clear and moist and mucous membranes are normal.   Eyes: Conjunctivae and EOM are normal. Pupils are equal, round, and " reactive to light.   Neck: Normal range of motion. Neck supple. Carotid bruit is not present. No thyromegaly present.   Cardiovascular: Normal rate and regular rhythm.    No murmur heard.  Pulmonary/Chest: Effort normal and breath sounds normal. No respiratory distress. He has no wheezes. He has no rales.   Abdominal: Soft. Bowel sounds are normal. He exhibits no distension and no mass. There is no hepatosplenomegaly. There is no tenderness. Hernia confirmed negative in the ventral area.   Musculoskeletal: He exhibits no edema.   Lymphadenopathy:     He has no cervical adenopathy.   Neurological: He is alert and oriented to person, place, and time. No cranial nerve deficit.   Skin: No rash noted.   Psychiatric: He has a normal mood and affect. Judgment normal.   Vitals reviewed.

## 2021-06-29 ENCOUNTER — OFFICE VISIT (OUTPATIENT)
Dept: FAMILY MEDICINE | Facility: CLINIC | Age: 36
End: 2021-06-29
Payer: COMMERCIAL

## 2021-06-29 VITALS
DIASTOLIC BLOOD PRESSURE: 88 MMHG | RESPIRATION RATE: 16 BRPM | SYSTOLIC BLOOD PRESSURE: 136 MMHG | WEIGHT: 205 LBS | HEART RATE: 72 BPM | OXYGEN SATURATION: 98 % | HEIGHT: 69 IN | TEMPERATURE: 97.3 F | BODY MASS INDEX: 30.36 KG/M2

## 2021-06-29 DIAGNOSIS — G40.109 TEMPORAL LOBE EPILEPSY (H): ICD-10-CM

## 2021-06-29 DIAGNOSIS — R30.0 DYSURIA: Primary | ICD-10-CM

## 2021-06-29 LAB
ALBUMIN UR-MCNC: NEGATIVE MG/DL
APPEARANCE UR: CLEAR
BILIRUB UR QL STRIP: NEGATIVE
COLOR UR AUTO: YELLOW
GLUCOSE UR STRIP-MCNC: NEGATIVE MG/DL
HGB UR QL STRIP: NEGATIVE
KETONES UR STRIP-MCNC: NEGATIVE MG/DL
LEUKOCYTE ESTERASE UR QL STRIP: NEGATIVE
NITRATE UR QL: NEGATIVE
PH UR STRIP: 6 PH (ref 5–7)
SOURCE: NORMAL
SP GR UR STRIP: 1.02 (ref 1–1.03)
UROBILINOGEN UR STRIP-ACNC: 0.2 EU/DL (ref 0.2–1)

## 2021-06-29 PROCEDURE — 87491 CHLMYD TRACH DNA AMP PROBE: CPT | Performed by: FAMILY MEDICINE

## 2021-06-29 PROCEDURE — 81003 URINALYSIS AUTO W/O SCOPE: CPT | Performed by: FAMILY MEDICINE

## 2021-06-29 PROCEDURE — 87591 N.GONORRHOEAE DNA AMP PROB: CPT | Performed by: FAMILY MEDICINE

## 2021-06-29 PROCEDURE — 99203 OFFICE O/P NEW LOW 30 MIN: CPT | Performed by: FAMILY MEDICINE

## 2021-06-29 ASSESSMENT — ANXIETY QUESTIONNAIRES
6. BECOMING EASILY ANNOYED OR IRRITABLE: NOT AT ALL
7. FEELING AFRAID AS IF SOMETHING AWFUL MIGHT HAPPEN: NOT AT ALL
1. FEELING NERVOUS, ANXIOUS, OR ON EDGE: NOT AT ALL
IF YOU CHECKED OFF ANY PROBLEMS ON THIS QUESTIONNAIRE, HOW DIFFICULT HAVE THESE PROBLEMS MADE IT FOR YOU TO DO YOUR WORK, TAKE CARE OF THINGS AT HOME, OR GET ALONG WITH OTHER PEOPLE: NOT DIFFICULT AT ALL
5. BEING SO RESTLESS THAT IT IS HARD TO SIT STILL: SEVERAL DAYS
2. NOT BEING ABLE TO STOP OR CONTROL WORRYING: NOT AT ALL
3. WORRYING TOO MUCH ABOUT DIFFERENT THINGS: NOT AT ALL
GAD7 TOTAL SCORE: 2

## 2021-06-29 ASSESSMENT — PATIENT HEALTH QUESTIONNAIRE - PHQ9
5. POOR APPETITE OR OVEREATING: SEVERAL DAYS
SUM OF ALL RESPONSES TO PHQ QUESTIONS 1-9: 3

## 2021-06-29 ASSESSMENT — MIFFLIN-ST. JEOR: SCORE: 1850.25

## 2021-06-29 NOTE — PROGRESS NOTES
"    Assessment & Plan     Dysuria  - UA reflex to Microscopic and Culture- no signs of urinary tract infection  Symptoms on & off. Chlamydia & gonorrhea test in urine is pending  [facility]-3 weeks ago,Negative sexually transmitted infection    Also treated on 6/9/21- for NASRIN with  zithromax - single dose.  today chlamydia & gonorrhea is sent and pending-if negative , follow up as needed      Reassurance given- no signs of urinary tract infection.  Labs futured to be repeated within 1 week as lab only appointment     Temporal lobe epilepsy (H)  Stable on lamotrigine under care of neurology.  No medications changes          30minutes spent on the date of the encounter doing chart review, history and exam, documentation and further activities per the note  [id number]}     Tobacco Cessation:   reports that he has been smoking. He has been smoking about 0.50 packs per day. He has never used smokeless tobacco.  Tobacco Cessation Action Plan: Phone counseling: Place order for Tobacco Cessation Referral    BMI:   Estimated body mass index is 30.27 kg/m  as calculated from the following:    Height as of this encounter: 1.753 m (5' 9\").    Weight as of this encounter: 93 kg (205 lb).   Weight management plan: Discussed healthy diet and exercise guidelines        Return in about 1 week (around 7/6/2021) for concerns,unresolved.    Kandice Lea MD  [facility]    Ruth Ibarra is a 35 year old who presents for the following health issues     HPI     Genitourinary - Male  Onset/Duration: 3 weeks   Description:   Dysuria (painful urination): YES- sometimes   Hematuria (blood in urine): not sure   Frequency: YES  Waking at night to urinate: no  Hesitancy (delay in urine): no  Retention (unable to empty): no  Decrease in urinary flow: no  Incontinence: no  Progression of Symptoms:  improving  Accompanying Signs & Symptoms:  Fever: no  Back/Flank pain: no  Urethral discharge: no  Testicle " "lumps/masses/pain: no  Nausea and/or vomiting: no  Abdominal pain: no  History:   History of frequent UTI s: no  History of kidney stones: YES- in the past   History of hernias: YES- at age 1yrs  Personal or Family history of Prostate problems: not sure   Sexually active: YES- kind of new partner   Precipitating or alleviating factors: None  Therapies tried and outcome: was seen at [facility] and given antibiotic nothing came back positive they just wanted to be careful      New Patient/Transfer of Care    Review of Systems   Constitutional, HEENT, cardiovascular, pulmonary, GI, , musculoskeletal, neuro, skin, endocrine and psych systems are negative, except as otherwise noted.      Objective    /88   Pulse 72   Temp 97.3  F (36.3  C) (Skin)   Resp 16   Ht 1.753 m (5' 9\")   Wt 93 kg (205 lb)   SpO2 98%   BMI 30.27 kg/m    Body mass index is 30.27 kg/m .  Physical Exam   GENERAL: healthy, alert and no distress  NECK: no adenopathy, no asymmetry, masses, or scars and thyroid normal to palpation  RESP: lungs clear to auscultation - no rales, rhonchi or wheezes  CV: regular rate and rhythm, normal S1 S2, no S3 or S4, no murmur, click or rub, no peripheral edema and peripheral pulses strong  ABDOMEN: soft, nontender, no hepatosplenomegaly, no masses and bowel sounds normal  PSYCH: mentation appears normal, affect normal/bright  Results for orders placed or performed in visit on 06/29/21 (from the past 24 hour(s))   UA reflex to Microscopic and Culture    Specimen: Midstream Urine   Result Value Ref Range    Color Urine Yellow     Appearance Urine Clear     Glucose Urine Negative NEG^Negative mg/dL    Bilirubin Urine Negative NEG^Negative    Ketones Urine Negative NEG^Negative mg/dL    Specific Gravity Urine 1.025 1.003 - 1.035    Blood Urine Negative NEG^Negative    pH Urine 6.0 5.0 - 7.0 pH    Protein Albumin Urine Negative NEG^Negative mg/dL    Urobilinogen Urine 0.2 0.2 - 1.0 EU/dL    Nitrite Urine " Negative NEG^Negative    Leukocyte Esterase Urine Negative NEG^Negative    Source Midstream Urine

## 2021-06-29 NOTE — PATIENT INSTRUCTIONS
Symptoms on & off  [facility]  Negative sexually transmitted infection    Also treated on 6/9/21- for NASRIN with  zithromax - single dose  Reassurance given- no signs of urinary tract infection.  Labs futured to be repeated within 1 week as lab only appointment

## 2021-06-30 LAB
C TRACH DNA SPEC QL NAA+PROBE: NEGATIVE
N GONORRHOEA DNA SPEC QL NAA+PROBE: NEGATIVE
SPECIMEN SOURCE: NORMAL
SPECIMEN SOURCE: NORMAL

## 2021-06-30 ASSESSMENT — ANXIETY QUESTIONNAIRES: GAD7 TOTAL SCORE: 2

## 2021-09-19 ENCOUNTER — HEALTH MAINTENANCE LETTER (OUTPATIENT)
Age: 36
End: 2021-09-19

## 2021-12-21 ENCOUNTER — OFFICE VISIT (OUTPATIENT)
Dept: NEUROLOGY | Facility: CLINIC | Age: 36
End: 2021-12-21
Payer: COMMERCIAL

## 2021-12-21 VITALS
SYSTOLIC BLOOD PRESSURE: 142 MMHG | HEART RATE: 102 BPM | TEMPERATURE: 97.1 F | DIASTOLIC BLOOD PRESSURE: 91 MMHG | WEIGHT: 212.2 LBS | BODY MASS INDEX: 31.34 KG/M2

## 2021-12-21 DIAGNOSIS — G40.109 FOCAL EPILEPSY (H): ICD-10-CM

## 2021-12-21 RX ORDER — LAMOTRIGINE 200 MG/1
200 TABLET ORAL DAILY
Qty: 180 TABLET | Refills: 3 | Status: SHIPPED | OUTPATIENT
Start: 2021-12-21 | End: 2021-12-23

## 2021-12-21 ASSESSMENT — PATIENT HEALTH QUESTIONNAIRE - PHQ9: SUM OF ALL RESPONSES TO PHQ QUESTIONS 1-9: 5

## 2021-12-21 NOTE — LETTER
"2021       RE: Fred Alva  : 1985   MRN: [id number]      Dear Colleague,    Thank you for referring your patient, Fred Alva, to the [facility] EPILEPSY [facility]ARE at [facility]. Please see a copy of my visit note below.    [facility] Epilepsy Care Progress Note      Patient:  Fred Alva  :  1985   Age:  36 year old   Today's Office Visit:  2021    Epilepsy Data:    Patient History  Primary Epileptologist/Provider: Shira Sanchez M.D.  Epilepsy Syndrome: Temporal lobe epilepsy  Age of Onset: 14 years     Seizure Record  Current Visit Date: 21  Previous Visit Date: 09/10/20  Seizure Type 1: Simple partial seizures with sensory symptoms  Description of Sz Type 1: aura, consist of a crescendo sound, which lasts approximately 3 seconds, and he has this \"roller coaster feeling.  Seizure Type 2: Simple partial onset followed by impairment of consciousness  Description of Sz Type 2: They start with an aura, consist of a crescendo sound, which lasts approximately 3 seconds, and he has this \"roller coaster feeling,\" then he would blank out for 10-15 seconds.  Friends have witnessed fingers or toes twitching, then he would be confused for a few minutes.  Seizure Type 3: Tonic-clonic seizures      History of Present Illness:    Fred returns to the clinic for a follow up on his epilepsy. He was last seen 2020. He did not have any seizures since last visit. He is taking lamotrigine 200 mg bid. He denies side effects.    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     Current Outpatient Medications   Medication Sig Dispense Refill     lamoTRIgine (LAMICTAL) 100 MG tablet Take 2 tablets (200 mg) by mouth 2 times daily 360 tablet 3     lamoTRIgine (LAMICTAL) 200 MG tablet Take 1 tablet (200 mg) by mouth daily 180 tablet 3        Medication Notes:        AED Medication Compliance:  compliant most of the " time    Other Issues:    Is patient safe to drive:  Yes    Exam:    BP (!) 142/91   Pulse 102   Temp 97.1  F (36.2  C) (Temporal)   Wt 212 lb 3.2 oz (96.3 kg)   BMI 31.34 kg/m       Wt Readings from Last 5 Encounters:   12/21/21 212 lb 3.2 oz (96.3 kg)   06/29/21 205 lb (93 kg)   02/13/20 205 lb 6.4 oz (93.2 kg)   08/12/19 190 lb 9.6 oz (86.5 kg)   02/18/19 194 lb (88 kg)     General Appearance: Alert, awake, cooperative, pleasant, NAD  Gait and tandem gait: steady  Attention Span:  Normal  Language/speech: no aphasia or dysarthria  Extraocular Movements:  Normal  Coordination:  Normal finger to nose  Motor Exam: normal tone, bulk and strength 5/5 bilaterally    Assessment and Plan:   Temporal lobe epilepsy: seizures have been controlled on lamotrigine monotherapy. He has some insomnia. I advised him to take his lamotrigine earlier in the afternoon, and he did that, but it did not help with insomnia.      - Continue lamotrigine 200 mg twice a day.    - Obtain lamotrigine level     - RTC in 1 year.        Shira Sanchez MD                        Again, thank you for allowing me to participate in the care of your patient.      Sincerely,    Shira Sanchez MD

## 2021-12-21 NOTE — PROGRESS NOTES
"[facility] Epilepsy Care Progress Note      Patient:  Fred Alva  :  1985   Age:  36 year old   Today's Office Visit:  2021    Epilepsy Data:    Patient History  Primary Epileptologist/Provider: Shira Sanchez M.D.  Epilepsy Syndrome: Temporal lobe epilepsy  Age of Onset: 14 years     Seizure Record  Current Visit Date: 21  Previous Visit Date: 09/10/20  Seizure Type 1: Simple partial seizures with sensory symptoms  Description of Sz Type 1: aura, consist of a crescendo sound, which lasts approximately 3 seconds, and he has this \"roller coaster feeling.  Seizure Type 2: Simple partial onset followed by impairment of consciousness  Description of Sz Type 2: They start with an aura, consist of a crescendo sound, which lasts approximately 3 seconds, and he has this \"roller coaster feeling,\" then he would blank out for 10-15 seconds.  Friends have witnessed fingers or toes twitching, then he would be confused for a few minutes.  Seizure Type 3: Tonic-clonic seizures      History of Present Illness:    Fred returns to the clinic for a follow up on his epilepsy. He was last seen 2020. He did not have any seizures since last visit. He is taking lamotrigine 200 mg bid. He denies side effects.    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     Current Outpatient Medications   Medication Sig Dispense Refill     lamoTRIgine (LAMICTAL) 100 MG tablet Take 2 tablets (200 mg) by mouth 2 times daily 360 tablet 3     lamoTRIgine (LAMICTAL) 200 MG tablet Take 1 tablet (200 mg) by mouth daily 180 tablet 3        Medication Notes:        AED Medication Compliance:  compliant most of the time    Other Issues:    Is patient safe to drive:  Yes    Exam:    BP (!) 142/91   Pulse 102   Temp 97.1  F (36.2  C) (Temporal)   Wt 212 lb 3.2 oz (96.3 kg)   BMI 31.34 kg/m       Wt Readings from Last 5 Encounters:   21 212 lb 3.2 oz (96.3 kg)   21 205 lb (93 kg) "   02/13/20 205 lb 6.4 oz (93.2 kg)   08/12/19 190 lb 9.6 oz (86.5 kg)   02/18/19 194 lb (88 kg)     General Appearance: Alert, awake, cooperative, pleasant, NAD  Gait and tandem gait: steady  Attention Span:  Normal  Language/speech: no aphasia or dysarthria  Extraocular Movements:  Normal  Coordination:  Normal finger to nose  Motor Exam: normal tone, bulk and strength 5/5 bilaterally    Assessment and Plan:   Temporal lobe epilepsy: seizures have been controlled on lamotrigine monotherapy. He has some insomnia. I advised him to take his lamotrigine earlier in the afternoon, and he did that, but it did not help with insomnia.      - Continue lamotrigine 200 mg twice a day.    - Obtain lamotrigine level     - RTC in 1 year.        Shira Sanchez MD

## 2021-12-22 ENCOUNTER — TELEPHONE (OUTPATIENT)
Dept: NEUROLOGY | Facility: CLINIC | Age: 36
End: 2021-12-22
Payer: COMMERCIAL

## 2021-12-23 DIAGNOSIS — G40.109 FOCAL EPILEPSY (H): ICD-10-CM

## 2021-12-23 RX ORDER — LAMOTRIGINE 200 MG/1
200 TABLET ORAL 2 TIMES DAILY
Qty: 180 TABLET | Refills: 3 | Status: SHIPPED | OUTPATIENT
Start: 2021-12-23 | End: 2022-12-20

## 2021-12-23 NOTE — TELEPHONE ENCOUNTER
Prescription was reordered during 12/21/2021 visit, however was sent to a different pharmacy.  Will route to requested pharmacy

## 2022-05-01 ENCOUNTER — HEALTH MAINTENANCE LETTER (OUTPATIENT)
Age: 37
End: 2022-05-01

## 2022-07-26 ENCOUNTER — TELEPHONE (OUTPATIENT)
Dept: NEUROLOGY | Facility: CLINIC | Age: 37
End: 2022-07-26

## 2022-07-27 ENCOUNTER — TRANSCRIBE ORDERS (OUTPATIENT)
Dept: OTHER | Age: 37
End: 2022-07-27

## 2022-07-27 DIAGNOSIS — M54.50 LOW BACK PAIN: Primary | ICD-10-CM

## 2022-11-21 ENCOUNTER — HEALTH MAINTENANCE LETTER (OUTPATIENT)
Age: 37
End: 2022-11-21

## 2022-11-30 ENCOUNTER — APPOINTMENT (OUTPATIENT)
Dept: GENERAL RADIOLOGY | Facility: CLINIC | Age: 37
End: 2022-11-30
Attending: EMERGENCY MEDICINE
Payer: COMMERCIAL

## 2022-11-30 ENCOUNTER — HOSPITAL ENCOUNTER (EMERGENCY)
Facility: CLINIC | Age: 37
Discharge: HOME | End: 2022-11-30
Attending: EMERGENCY MEDICINE | Admitting: EMERGENCY MEDICINE
Payer: COMMERCIAL

## 2022-11-30 VITALS
BODY MASS INDEX: 30.78 KG/M2 | WEIGHT: 215 LBS | HEIGHT: 70 IN | HEART RATE: 110 BPM | OXYGEN SATURATION: 99 % | TEMPERATURE: 98.1 F | RESPIRATION RATE: 16 BRPM | DIASTOLIC BLOOD PRESSURE: 105 MMHG | SYSTOLIC BLOOD PRESSURE: 174 MMHG

## 2022-11-30 DIAGNOSIS — R20.2 PARESTHESIAS: ICD-10-CM

## 2022-11-30 DIAGNOSIS — M25.552 HIP PAIN, LEFT: ICD-10-CM

## 2022-11-30 LAB
ALBUMIN SERPL-MCNC: 4 G/DL (ref 3.4–5)
ALP SERPL-CCNC: 51 U/L (ref 40–150)
ALT SERPL W P-5'-P-CCNC: 56 U/L (ref 0–70)
ANION GAP SERPL CALCULATED.3IONS-SCNC: 7 MMOL/L (ref 3–14)
AST SERPL W P-5'-P-CCNC: 32 U/L (ref 0–45)
BASOPHILS # BLD AUTO: 0 10E3/UL (ref 0–0.2)
BASOPHILS NFR BLD AUTO: 0 %
BILIRUB SERPL-MCNC: 1 MG/DL (ref 0.2–1.3)
BUN SERPL-MCNC: 13 MG/DL (ref 7–30)
CALCIUM SERPL-MCNC: 9.3 MG/DL (ref 8.5–10.1)
CHLORIDE BLD-SCNC: 106 MMOL/L (ref 94–109)
CK SERPL-CCNC: 125 U/L (ref 30–300)
CO2 SERPL-SCNC: 25 MMOL/L (ref 20–32)
CREAT SERPL-MCNC: 0.65 MG/DL (ref 0.66–1.25)
CRP SERPL-MCNC: <2.9 MG/L (ref 0–8)
EOSINOPHIL # BLD AUTO: 0.2 10E3/UL (ref 0–0.7)
EOSINOPHIL NFR BLD AUTO: 2 %
ERYTHROCYTE [DISTWIDTH] IN BLOOD BY AUTOMATED COUNT: 11 % (ref 10–15)
ERYTHROCYTE [SEDIMENTATION RATE] IN BLOOD BY WESTERGREN METHOD: 4 MM/HR (ref 0–15)
GFR SERPL CREATININE-BSD FRML MDRD: >90 ML/MIN/1.73M2
GLUCOSE BLD-MCNC: 94 MG/DL (ref 70–99)
HCT VFR BLD AUTO: 43.6 % (ref 40–53)
HGB BLD-MCNC: 15.2 G/DL (ref 13.3–17.7)
IMM GRANULOCYTES # BLD: 0 10E3/UL
IMM GRANULOCYTES NFR BLD: 0 %
LYMPHOCYTES # BLD AUTO: 2.2 10E3/UL (ref 0.8–5.3)
LYMPHOCYTES NFR BLD AUTO: 31 %
MCH RBC QN AUTO: 31.9 PG (ref 26.5–33)
MCHC RBC AUTO-ENTMCNC: 34.9 G/DL (ref 31.5–36.5)
MCV RBC AUTO: 91 FL (ref 78–100)
MONOCYTES # BLD AUTO: 0.7 10E3/UL (ref 0–1.3)
MONOCYTES NFR BLD AUTO: 9 %
NEUTROPHILS # BLD AUTO: 4.2 10E3/UL (ref 1.6–8.3)
NEUTROPHILS NFR BLD AUTO: 58 %
NRBC # BLD AUTO: 0 10E3/UL
NRBC BLD AUTO-RTO: 0 /100
PLATELET # BLD AUTO: 244 10E3/UL (ref 150–450)
POTASSIUM BLD-SCNC: 3.9 MMOL/L (ref 3.4–5.3)
PROT SERPL-MCNC: 7.4 G/DL (ref 6.8–8.8)
RBC # BLD AUTO: 4.77 10E6/UL (ref 4.4–5.9)
SODIUM SERPL-SCNC: 138 MMOL/L (ref 133–144)
WBC # BLD AUTO: 7.3 10E3/UL (ref 4–11)

## 2022-11-30 PROCEDURE — 86140 C-REACTIVE PROTEIN: CPT | Performed by: EMERGENCY MEDICINE

## 2022-11-30 PROCEDURE — 85025 COMPLETE CBC W/AUTO DIFF WBC: CPT | Performed by: EMERGENCY MEDICINE

## 2022-11-30 PROCEDURE — 85652 RBC SED RATE AUTOMATED: CPT | Performed by: EMERGENCY MEDICINE

## 2022-11-30 PROCEDURE — 73522 X-RAY EXAM HIPS BI 3-4 VIEWS: CPT

## 2022-11-30 PROCEDURE — 99284 EMERGENCY DEPT VISIT MOD MDM: CPT

## 2022-11-30 PROCEDURE — 82550 ASSAY OF CK (CPK): CPT | Performed by: EMERGENCY MEDICINE

## 2022-11-30 PROCEDURE — 80053 COMPREHEN METABOLIC PANEL: CPT | Performed by: EMERGENCY MEDICINE

## 2022-11-30 PROCEDURE — 36415 COLL VENOUS BLD VENIPUNCTURE: CPT | Performed by: EMERGENCY MEDICINE

## 2022-11-30 RX ORDER — ACETAMINOPHEN 500 MG
1000 TABLET ORAL ONCE
Status: DISCONTINUED | OUTPATIENT
Start: 2022-11-30 | End: 2022-11-30 | Stop reason: HOSPADM

## 2022-11-30 RX ORDER — IBUPROFEN 600 MG/1
600 TABLET, FILM COATED ORAL ONCE
Status: DISCONTINUED | OUTPATIENT
Start: 2022-11-30 | End: 2022-11-30 | Stop reason: HOSPADM

## 2022-11-30 ASSESSMENT — ACTIVITIES OF DAILY LIVING (ADL): ADLS_ACUITY_SCORE: 35

## 2022-11-30 ASSESSMENT — ENCOUNTER SYMPTOMS: NUMBNESS: 0

## 2022-11-30 NOTE — ED TRIAGE NOTES
Pt ambulated in with cane.  Reports a chronic issue with bilat hips and numbness tingling that radiates down to feet.  States no loss bowel/bladder.  Has had MRIs for this in past.  Plan for neurology in Dec but states the pain is just getting worse.      Triage Assessment     Row Name 11/30/22 0831       Triage Assessment (Adult)    Airway WDL WDL       Respiratory WDL    Respiratory WDL WDL       Skin Circulation/Temperature WDL    Skin Circulation/Temperature WDL WDL       Cardiac WDL    Cardiac WDL WDL       Peripheral/Neurovascular WDL    Peripheral Neurovascular WDL X       Cognitive/Neuro/Behavioral WDL    Cognitive/Neuro/Behavioral WDL WDL

## 2022-11-30 NOTE — ED PROVIDER NOTES
"  History     Chief Complaint:  Hip Pain      HPI   Fred Alva is a 37 year old male who presents with hip pain. The bilateral hip pain started in may and began to worsen over time. He also started to feel tingling in his left leg, and has lost balance and fallen due to his symptoms as well. He was seen by orthopedics. He will be seen by a neurologist in December. He denies trauma, or numbness in his extremities. He denies bowel/bladder incontinence. He uses a cane to walk.    Review of Systems   Genitourinary:        No bladder/bowel incontinence   Musculoskeletal:        Hip pain   Neurological: Negative for numbness.        Tingling   All other systems reviewed and are negative.    Allergies:  Codeine Camsylate  Paxil [Serotonin Reuptake Inhibitors]    Medications:    lamotrigine     Past Medical History:    Epilepsy  Depression    Past Surgical History:    Orthopedic surgery  Tonsillectomy  Drury tooth extraction    Family History:    Mother: asthma, cancer  Father: diabetes, hypertension  Mother: Rheumatoid arthritis, breast cancer    Social History:  Presents alone  Physical Exam     Patient Vitals for the past 24 hrs:   BP Temp Temp src Pulse Resp SpO2 Height Weight   11/30/22 0836 (!) 174/105 98.1  F (36.7  C) Oral 110 16 99 % 1.778 m (5' 10\") 97.5 kg (215 lb)       Physical Exam  Vitals: reviewed by me  General: Pt seen on [facility], cooperative, and alert to conversation  Eyes: Tracking well, clear conjunctiva BL  ENT: MMM, midline trachea.   Lungs: No tachypnea, no accessory muscle use. No respiratory distress.   CV: Rate as above  Abd: Soft, non tender, no guarding, no rebound. Non distended  MSK: no joint effusion.  No evidence of trauma  Skin: No rash  Neuro: Clear speech and no facial droop.  Bilateral lower extremities with sensation intact to light touch throughout, 5 out of 5 motor throughout, no foot droop, ambulatory with strong steady gait.  Robust 2+ patellar reflexes " bilaterally  Psych: Not RIS, no e/o AH/VH  Emergency Department Course     Imaging:  XR Pelvis and Hip Bilateral 2 Views   Final Result   IMPRESSION: Both hip joint spaces are maintained without fracture or   dislocation. There are small surgical clips projecting over the left   hip.      ROCIO COVARRUBIAS MD            SYSTEM ID:  [id number]        Report per radiology    Laboratory:  Labs Ordered and Resulted from Time of ED Arrival to Time of ED Departure   COMPREHENSIVE METABOLIC PANEL - Abnormal       Result Value    Sodium 138      Potassium 3.9      Chloride 106      Carbon Dioxide (CO2) 25      Anion Gap 7      Urea Nitrogen 13      Creatinine 0.65 (*)     Calcium 9.3      Glucose 94      Alkaline Phosphatase 51      AST 32      ALT 56      Protein Total 7.4      Albumin 4.0      Bilirubin Total 1.0      GFR Estimate >90     CK TOTAL - Normal         ERYTHROCYTE SEDIMENTATION RATE AUTO - Normal    Erythrocyte Sedimentation Rate 4     CRP INFLAMMATION - Normal    CRP Inflammation <2.9     CBC WITH PLATELETS AND DIFFERENTIAL    WBC Count 7.3      RBC Count 4.77      Hemoglobin 15.2      Hematocrit 43.6      MCV 91      MCH 31.9      MCHC 34.9      RDW 11.0      Platelet Count 244      % Neutrophils 58      % Lymphocytes 31      % Monocytes 9      % Eosinophils 2      % Basophils 0      % Immature Granulocytes 0      NRBCs per 100 WBC 0      Absolute Neutrophils 4.2      Absolute Lymphocytes 2.2      Absolute Monocytes 0.7      Absolute Eosinophils 0.2      Absolute Basophils 0.0      Absolute Immature Granulocytes 0.0      Absolute NRBCs 0.0       Emergency Department Course:    Reviewed:  I reviewed nursing notes, vitals, past medical history and Care Everywhere    Assessments:  0910 I obtained history and examined the patient as noted above.   1030 I rechecked the patient and explained findings.     Disposition:  The patient was discharged to home.     Impression & Plan      Medical Decision  Making:  This is a very pleasant 37-year-old male who presents the emergency room with bilateral hip pain, nontraumatic, as well as possible paresthesias on his left lower extremity.  Here in the ER he has a robustly normal neurologic exam including normal patellar reflexes.  His muscles are grossly symmetric, and his labs including all of his inflammatory markers and a CK are normal.  MS certainly is a possibility, though having had symptoms for the last 6 months, I do think he stable for continued management in the outpatient setting given his completely normal neurologic exam here.  He also was complaining of bilateral hip pain, x-rays show no evidence of inflammation, and he has full range of motion to hips here with no effusions.  I do think that he stable for outpatient management.  I do think that he needs to follow with his regular doctor as well, and pursue a neurological work-up as well, which she is in the process of doing.  Patient is understandably frustrated that no diagnosis was made, but understands that overall we are able to provide a significant amount of reassurance against emergent malady here.  Red flags for when to come back to the ER were discussed in detail.    Also will note that left-sided surgical clips were noted on the x-ray, patient voices some concern about never having had surgery in this area of his body before, apart from a possible hernia surgery when he was a child.  It does look like the clips are possibly overlying the skin based on my review of the multiple angles of the x-ray, and on my inspection of the patient's left hip and groin, the skin is unremarkable with no tenderness or evidence of an abscess or infection or foreign body reaction.  Patient knows to have this followed up with his regular doctor as well.    Diagnosis:    ICD-10-CM    1. Paresthesias  R20.2       2. Hip pain, left  M25.552         Scribe Disclosure:  Jesusita VANESSA, am serving as a scribe at 8:40  AM on 11/30/2022 to document services personally performed by Joao Arrieta MD based on my observations and the provider's statements to me.      Joao Arrieta MD  11/30/22 7948

## 2022-12-20 ENCOUNTER — OFFICE VISIT (OUTPATIENT)
Dept: NEUROLOGY | Facility: CLINIC | Age: 37
End: 2022-12-20
Payer: COMMERCIAL

## 2022-12-20 VITALS — SYSTOLIC BLOOD PRESSURE: 176 MMHG | TEMPERATURE: 97.5 F | DIASTOLIC BLOOD PRESSURE: 91 MMHG | HEART RATE: 96 BPM

## 2022-12-20 DIAGNOSIS — G40.109 FOCAL EPILEPSY (H): ICD-10-CM

## 2022-12-20 RX ORDER — LAMOTRIGINE 200 MG/1
200 TABLET ORAL 2 TIMES DAILY
Qty: 186 TABLET | Refills: 3 | Status: SHIPPED | OUTPATIENT
Start: 2022-12-20 | End: 2024-02-20

## 2022-12-20 ASSESSMENT — PATIENT HEALTH QUESTIONNAIRE - PHQ9: SUM OF ALL RESPONSES TO PHQ QUESTIONS 1-9: 14

## 2022-12-20 NOTE — PROGRESS NOTES
"[facility] Epilepsy Care Progress Note      Patient:  Fred Alva  :  1985   Age:  37 year old   Today's Office Visit:  2022    Epilepsy Data:        Patient History  Primary Epileptologist/Provider: Shira Sanchez M.D.  Epilepsy Syndrome: Temporal lobe epilepsy  Age of Onset: 14 years      Seizure Record  Current Visit Date: 22  Previous Visit Date: 21  Seizure Type 1: Simple partial seizures with sensory symptoms  Description of Sz Type 1: aura, consist of a crescendo sound, which lasts approximately 3 seconds, and he has this \"roller coaster\" feeling.  Seizure Type 2: Simple partial onset followed by impairment of consciousness  Description of Sz Type 2: They start with an aura, consist of a crescendo sound, which lasts approximately 3 seconds, and he has this \"roller coaster feeling,\" then he would blank out for 10-15 seconds.  Friends have witnessed fingers or toes twitching, then he would be confused for a few minutes.  Seizure Type 3: Tonic-clonic seizures      History of Present Illness:    Mr. Fred Alva returns to the clinic for follow-up on his epilepsy.  He was last seen 2021.  He did not have any seizures since last visit.  He is taking lamotrigine 200 mg twice daily.  He denies side effects.  His last lamotrigine level 20: 5.4    He is complaining of leg pain.  He had couple falls.  He felt his legs gave away before the fall.  His pain starts in his hip and goes all the way down to his feet, most noticeable in his left leg.  He always has a dull pain, but occaisonally it gets excrucaiating, 9-10.  Occasionally he gets a \"zinger\" in his legs, most often in his left leg.  He denies back pain.  He was seen at ER 2022 for this.  He had x-ray pelvis and hip bilaterally on 2022 which was unremarkable.    Current Outpatient Medications   Medication Sig Dispense Refill     lamoTRIgine (LAMICTAL) 200 MG tablet Take 1 tablet (200 mg) by mouth " 2 times daily 180 tablet 3      Medication Notes:        AED Medication Compliance:  compliant most of the time    Other Issues:    Is patient safe to drive:  Yes    Exam:    BP (!) 176/91   Pulse 96   Temp 97.5  F (36.4  C)      Wt Readings from Last 5 Encounters:   11/30/22 215 lb (97.5 kg)   12/21/21 212 lb 3.2 oz (96.3 kg)   06/29/21 205 lb (93 kg)   02/13/20 205 lb 6.4 oz (93.2 kg)   08/12/19 190 lb 9.6 oz (86.5 kg)     General Appearance: Alert, awake, cooperative, pleasant, NAD  Gait: steady  Attention Span:  Normal  Language/speech: no aphasia or dysarthria  Extraocular Movements:  Normal  Coordination:  Normal FNF  Facial Strength:  Normal  Motor Exam: normal tone, bulk and strength 5/5, except left lower extremity 4+/5  Limb Sensation:   Intact to light touch, pinprick, vibration/position    Assessment and Plan:     #1 focal epilepsy: Excellent seizure control and side effect profile on lamotrigine monotherapy.  He has not had lamotrigine level since January 2020.  We will need to repeat lamotrigine level.      #2 lower extremity pain: He has a constant dull pain in both legs and at times has shooting pain from his hip, most often going to his left leg.  He denies back pain.  On his exam, he did not have sensory deficits in his feet.  Reflexes were symmetric.  He had mild weakness in his left lower extremity both proximal and distal muscles.  He says he has an appointment for this next week.    -Continue lamotrigine 200 mg twice a day.    -Obtain lamotrigine level    -Follow-up in 1 year.        As described above, I met with the patient for 20 minutes and during this time counseling was greater than 50% of the visit time.  I spent an additional 15 minutes on chart review and documentation. This note was created in part by the use of Dragon voice recognition system. Inadvertent grammatical errors and typographical errors may still exist.  Shira Sanchez MD

## 2022-12-20 NOTE — PROGRESS NOTES
Depression Response    Patient completed the PHQ-9 assessment for depression and scored >9? Yes  Question 9 on the PHQ-9 was positive for suicidality? No  Does patient have current mental health provider? No    Is this a virtual visit? No    I personally notified the following: visit provider     Of note- depression-like symptoms are due to possible MS diagnosis. Patient is seeing a neurologist for this soon.

## 2022-12-20 NOTE — LETTER
"2022       RE: Fred Alva  : 1985   MRN: [id number]      Dear Colleague,    Thank you for referring your patient, Fred Alva, to the [facility] EPILEPSY CARE at [facility]. Please see a copy of my visit note below.    Depression Response    Patient completed the PHQ-9 assessment for depression and scored >9? Yes  Question 9 on the PHQ-9 was positive for suicidality? No  Does patient have current mental health provider? No    Is this a virtual visit? No    I personally notified the following: visit provider     Of note- depression-like symptoms are due to possible MS diagnosis. Patient is seeing a neurologist for this soon.              [facility] Epilepsy Care Progress Note      Patient:  Fred Alva  :  1985   Age:  37 year old   Today's Office Visit:  2022    Epilepsy Data:        Patient History  Primary Epileptologist/Provider: Shira Sanchez M.D.  Epilepsy Syndrome: Temporal lobe epilepsy  Age of Onset: 14 years      Seizure Record  Current Visit Date: 22  Previous Visit Date: 21  Seizure Type 1: Simple partial seizures with sensory symptoms  Description of Sz Type 1: aura, consist of a crescendo sound, which lasts approximately 3 seconds, and he has this \"roller coaster\" feeling.  Seizure Type 2: Simple partial onset followed by impairment of consciousness  Description of Sz Type 2: They start with an aura, consist of a crescendo sound, which lasts approximately 3 seconds, and he has this \"roller coaster feeling,\" then he would blank out for 10-15 seconds.  Friends have witnessed fingers or toes twitching, then he would be confused for a few minutes.  Seizure Type 3: Tonic-clonic seizures      History of Present Illness:    Mr. Fred Alva returns to the clinic for follow-up on his epilepsy.  He was last seen 2021.  He did not have any seizures since last visit.  He is taking lamotrigine 200 mg " "twice daily.  He denies side effects.  His last lamotrigine level 1/16/20: 5.4    He is complaining of leg pain.  He had couple falls.  He felt his legs gave away before the fall.  His pain starts in his hip and goes all the way down to his feet, most noticeable in his left leg.  He always has a dull pain, but occaisonally it gets excrucaiating, 9-10.  Occasionally he gets a \"zinger\" in his legs, most often in his left leg.  He denies back pain.  He was seen at ER 11/30/2022 for this.  He had x-ray pelvis and hip bilaterally on 11/30/2022 which was unremarkable.    Current Outpatient Medications   Medication Sig Dispense Refill     lamoTRIgine (LAMICTAL) 200 MG tablet Take 1 tablet (200 mg) by mouth 2 times daily 180 tablet 3      Medication Notes:        AED Medication Compliance:  compliant most of the time    Other Issues:    Is patient safe to drive:  Yes    Exam:    BP (!) 176/91   Pulse 96   Temp 97.5  F (36.4  C)      Wt Readings from Last 5 Encounters:   11/30/22 215 lb (97.5 kg)   12/21/21 212 lb 3.2 oz (96.3 kg)   06/29/21 205 lb (93 kg)   02/13/20 205 lb 6.4 oz (93.2 kg)   08/12/19 190 lb 9.6 oz (86.5 kg)     General Appearance: Alert, awake, cooperative, pleasant, NAD  Gait: steady  Attention Span:  Normal  Language/speech: no aphasia or dysarthria  Extraocular Movements:  Normal  Coordination:  Normal FNF  Facial Strength:  Normal  Motor Exam: normal tone, bulk and strength 5/5, except left lower extremity 4+/5  Limb Sensation:   Intact to light touch, pinprick, vibration/position    Assessment and Plan:     #1 focal epilepsy: Excellent seizure control and side effect profile on lamotrigine monotherapy.  He has not had lamotrigine level since January 2020.  We will need to repeat lamotrigine level.      #2 lower extremity pain: He has a constant dull pain in both legs and at times has shooting pain from his hip, most often going to his left leg.  He denies back pain.  On his exam, he did not have " sensory deficits in his feet.  Reflexes were symmetric.  He had mild weakness in his left lower extremity both proximal and distal muscles.  He says he has an appointment for this next week.    -Continue lamotrigine 200 mg twice a day.    -Obtain lamotrigine level    -Follow-up in 1 year.        As described above, I met with the patient for 20 minutes and during this time counseling was greater than 50% of the visit time.  I spent an additional 15 minutes on chart review and documentation. This note was created in part by the use of Dragon voice recognition system. Inadvertent grammatical errors and typographical errors may still exist.  Shira Sanchez MD                        Again, thank you for allowing me to participate in the care of your patient.      Sincerely,    Shira Sanchez MD

## 2022-12-28 ENCOUNTER — OFFICE VISIT (OUTPATIENT)
Dept: NEUROLOGY | Facility: CLINIC | Age: 37
End: 2022-12-28
Attending: ORTHOPAEDIC SURGERY
Payer: COMMERCIAL

## 2022-12-28 VITALS — OXYGEN SATURATION: 98 % | HEART RATE: 101 BPM | SYSTOLIC BLOOD PRESSURE: 148 MMHG | DIASTOLIC BLOOD PRESSURE: 98 MMHG

## 2022-12-28 DIAGNOSIS — M79.604 BILATERAL LEG PAIN: ICD-10-CM

## 2022-12-28 DIAGNOSIS — R29.898 LEFT LEG WEAKNESS: Primary | ICD-10-CM

## 2022-12-28 DIAGNOSIS — M79.605 BILATERAL LEG PAIN: ICD-10-CM

## 2022-12-28 PROCEDURE — 99215 OFFICE O/P EST HI 40 MIN: CPT | Performed by: PSYCHIATRY & NEUROLOGY

## 2022-12-28 ASSESSMENT — PAIN SCALES - GENERAL: PAINLEVEL: MILD PAIN (3)

## 2022-12-28 NOTE — NURSING NOTE
"Fred Alva is a 37 year old male who presents for:  Chief Complaint   Patient presents with     Follow Up     MyChart Reminder Sent 12/27/22-SB   (New to Clinic not Neurology been treated through [facility] for years for seizures)Welcome Letter/Health History Sent 12/16/22- SB   weakness and tingling in legs- referred by Dr. Candy gonzalez in [facility]- scheduled per patient  Pain in hips/low back. Mostly left sided         Initial Vitals:  BP (!) 148/98   Pulse 101   SpO2 98%  Estimated body mass index is 30.85 kg/m  as calculated from the following:    Height as of 11/30/22: 1.778 m (5' 10\").    Weight as of 11/30/22: 97.5 kg (215 lb).. There is no height or weight on file to calculate BSA. BP completed using cuff size: regular  Mild Pain (3)    Nursing Comments:     Cathie Kramer MA    "

## 2022-12-28 NOTE — LETTER
"    12/28/2022         RE: Fred Alva  [address]  [place] [address]        Dear Colleague,    Thank you for referring your patient, Fred Alva, to the [facility] NEUROLOGY CLINICS [facility]. Please see a copy of my visit note below.    ESTABLISHED PATIENT NEUROLOGY NOTE    DATE OF VISIT: 12/28/2022  CLINIC LOCATION: [facility]  MRN: [id number]  PATIENT NAME: Fred Alva  YOB: 1985    REASON FOR VISIT:   Chief Complaint   Patient presents with     Follow Up     Bilateral lower extremity shooting pain and weakness of the left leg.     SUBJECTIVE:                                                      HISTORY OF PRESENT ILLNESS: Patient is new to me, but is currently followed at [facility] for epilepsy.  History and prior evaluation are briefly summarized below.  Please refer to previous neurology notes for more detailed information.    Per chart review, the patient has history of temporal lobe epilepsy that started at 14 years of age.  He remains seizure-free on current therapy with lamotrigine 200 mg twice daily without noticeable side effects.  His last office visit with Dr. Esperanza Blanco was on 12/20/2022.  Follow-up in 1 year was advised.    Today, the patient reports that in April/May 2022 he developed bilateral hip pain with periodic shooting pain down his legs.  During that time he felt that his legs gave out from under him resulting in falls twice.  Was able to catch himself with no injuries.  Eventually, he was seen by chiropractor and referred to orthopedic surgeon.  Evaluation, including lumbar spine MRI was reportedly unrevealing (will request images).  He was recommended to see a neurologist.    Since then shooting pain continues to be intermittent.  Most of the pain is centered in his hips/pelvis.  Leg pain occurs mainly on his left side going down his leg.  He describes it as \"twinges and zingers\"/\"electric shock\".  Sometimes, the " pain keeps him up at night or wakes him up.  Could feel fine for weeks, except mild stiffness, but then pain comes back.  He also experiences intermittent fatigue, where it is hard to move and he just wants to sit down.  Denies any additional focal neurological symptoms.  Concerns regarding possibility of MS or other neurological problems.    Recent laboratory evaluation includes unremarkable CMP, normal CK (125), CRP, CBC, and sed rate.    Brain MRI with and without contrast from 9/18/2009 was normal.  Images were personally reviewed and independently interpreted.  EXAM:                                                    Physical Exam:   Vitals: BP (!) 148/98   Pulse 101   SpO2 98%     General: pt is in NAD, cooperative.  Skin: normal turgor, moist mucous membranes, no lesions/rashes noticed.  HEENT: ATNC, white sclera, normal conjunctiva.  Respiratory: Symmetric lung excursion, no accessory respiratory muscle use.  Abdomen: Non distended.  Neurological: awake, cooperative, follows commands, no aphasia or dysarthria noted, cranial nerves II-XII: no ptosis, extraocular motility is full, face is symmetric, tongue is midline, equally moves all extremities, strength is 5/5 bilaterally in both upper and right lower extremities, but left lower extremity strength proximally and distally is 4+/5 (unclear if related to effort), no pronator drift, deep tendon reflexes are equal bilaterally, sensation to the light touch, vibration, and pinprick is intact bilaterally, finger to nose and heel-knee-shin tests are intact bilaterally, casual/tandem gait is normal.  Able to hop on each leg, but hopping on the left leg is somewhat difficult.  ASSESSMENT AND PLAN:                                                    Assessment: 37 year old male patient presents bilateral hip/lower extremity shooting pain since April/May 2022 and mild left lower extremity weakness on neurological exam, which could be effort related, but organic causes  are not fully excluded, including demyelinating lesions.  Would be most likely localized to either thoracic or cervical spine in such case.  We will start with imaging of these areas.  Cervical/thoracic spine MRI's were ordered.  If negative, might consider doing brain MRI followed by EMG, if his focal findings persist on repeat neurologic exam.    Fred to follow up with Primary Care provider regarding elevated blood pressure.     Diagnoses:    ICD-10-CM    1. Left leg weakness  R29.898       2. Bilateral leg paresthesia  R20.2         Plan: At today's visit we thoroughly discussed various diagnostic possibilities for patient's symptoms, necessary evaluation, and the plan, which includes:  Orders Placed This Encounter   Procedures     MR Cervical Spine w/o & w Contrast     MR Thoracic Spine w/o & w Contrast     No new medications.     Additional recommendations after the work-up.    Next follow-up appointment is in the next 2-4 weeks or earlier if needed.    Total Time: 52 minutes spent on the date of the encounter doing chart review, history and exam, documentation and further activities per the note.    Tonio Larsen MD  [facility] Neurology  (Chart documentation was completed in part with Dragon voice-recognition software. Even though reviewed, some grammatical, spelling, and word errors may remain.)       Again, thank you for allowing me to participate in the care of your patient.        Sincerely,        Tonio Larsen MD

## 2022-12-28 NOTE — PATIENT INSTRUCTIONS
AFTER VISIT SUMMARY (AVS):    At today's visit we thoroughly discussed various diagnostic possibilities for your symptoms, necessary evaluation, and the plan, which includes:  Orders Placed This Encounter   Procedures    MR Cervical Spine w/o & w Contrast    MR Thoracic Spine w/o & w Contrast     No new medications.     Additional recommendations after the work-up.    Next follow-up appointment is in the next 2-4 weeks or earlier if needed.    Please do not hesitate to call me with any questions or concerns.    Thanks.

## 2022-12-28 NOTE — PROGRESS NOTES
"ESTABLISHED PATIENT NEUROLOGY NOTE    DATE OF VISIT: 12/28/2022  CLINIC LOCATION: [facility]  MRN: [id number]  PATIENT NAME: Fred Alva  YOB: 1985    REASON FOR VISIT:   Chief Complaint   Patient presents with     Follow Up     Bilateral lower extremity shooting pain and weakness of the left leg.     SUBJECTIVE:                                                      HISTORY OF PRESENT ILLNESS: Patient is new to me, but is currently followed at [facility] for epilepsy.  History and prior evaluation are briefly summarized below.  Please refer to previous neurology notes for more detailed information.    Per chart review, the patient has history of temporal lobe epilepsy that started at 14 years of age.  He remains seizure-free on current therapy with lamotrigine 200 mg twice daily without noticeable side effects.  His last office visit with Dr. Esperanza Blanco was on 12/20/2022.  Follow-up in 1 year was advised.    Today, the patient reports that in April/May 2022 he developed bilateral hip pain with periodic shooting pain down his legs.  During that time he felt that his legs gave out from under him resulting in falls twice.  Was able to catch himself with no injuries.  Eventually, he was seen by chiropractor and referred to orthopedic surgeon.  Evaluation, including lumbar spine MRI was reportedly unrevealing (will request images).  He was recommended to see a neurologist.    Since then shooting pain continues to be intermittent.  Most of the pain is centered in his hips/pelvis.  Leg pain occurs mainly on his left side going down his leg.  He describes it as \"twinges and zingers\"/\"electric shock\".  Sometimes, the pain keeps him up at night or wakes him up.  Could feel fine for weeks, except mild stiffness, but then pain comes back.  He also experiences intermittent fatigue, where it is hard to move and he just wants to sit down.  Denies any additional focal neurological symptoms.  " Concerns regarding possibility of MS or other neurological problems.    Recent laboratory evaluation includes unremarkable CMP, normal CK (125), CRP, CBC, and sed rate.    Brain MRI with and without contrast from 9/18/2009 was normal.  Images were personally reviewed and independently interpreted.  EXAM:                                                    Physical Exam:   Vitals: BP (!) 148/98   Pulse 101   SpO2 98%     General: pt is in NAD, cooperative.  Skin: normal turgor, moist mucous membranes, no lesions/rashes noticed.  HEENT: ATNC, white sclera, normal conjunctiva.  Respiratory: Symmetric lung excursion, no accessory respiratory muscle use.  Abdomen: Non distended.  Neurological: awake, cooperative, follows commands, no aphasia or dysarthria noted, cranial nerves II-XII: no ptosis, extraocular motility is full, face is symmetric, tongue is midline, equally moves all extremities, strength is 5/5 bilaterally in both upper and right lower extremities, but left lower extremity strength proximally and distally is 4+/5 (unclear if related to effort), no pronator drift, deep tendon reflexes are equal bilaterally, sensation to the light touch, vibration, and pinprick is intact bilaterally, finger to nose and heel-knee-shin tests are intact bilaterally, casual/tandem gait is normal.  Able to hop on each leg, but hopping on the left leg is somewhat difficult.  ASSESSMENT AND PLAN:                                                    Assessment: 37 year old male patient presents bilateral hip/lower extremity shooting pain since April/May 2022 and mild left lower extremity weakness on neurological exam, which could be effort related, but organic causes are not fully excluded, including demyelinating lesions.  Would be most likely localized to either thoracic or cervical spine in such case.  We will start with imaging of these areas.  Cervical/thoracic spine MRI's were ordered.  If negative, might consider doing brain  MRI followed by EMG, if his focal findings persist on repeat neurologic exam.    Fred to follow up with Primary Care provider regarding elevated blood pressure.     Diagnoses:    ICD-10-CM    1. Left leg weakness  R29.898       2. Bilateral leg paresthesia  R20.2         Plan: At today's visit we thoroughly discussed various diagnostic possibilities for patient's symptoms, necessary evaluation, and the plan, which includes:  Orders Placed This Encounter   Procedures     MR Cervical Spine w/o & w Contrast     MR Thoracic Spine w/o & w Contrast     No new medications.     Additional recommendations after the work-up.    Next follow-up appointment is in the next 2-4 weeks or earlier if needed.    Total Time: 52 minutes spent on the date of the encounter doing chart review, history and exam, documentation and further activities per the note.    Tonio Larsen MD  [facility] Neurology  (Chart documentation was completed in part with Dragon voice-recognition software. Even though reviewed, some grammatical, spelling, and word errors may remain.)

## 2022-12-30 DIAGNOSIS — G40.109 FOCAL EPILEPSY (H): ICD-10-CM

## 2022-12-30 RX ORDER — LAMOTRIGINE 200 MG/1
TABLET ORAL
Qty: 180 TABLET | OUTPATIENT
Start: 2022-12-30

## 2023-01-09 ENCOUNTER — HOSPITAL ENCOUNTER (OUTPATIENT)
Dept: MRI IMAGING | Facility: CLINIC | Age: 38
Discharge: HOME | End: 2023-01-09
Attending: PSYCHIATRY & NEUROLOGY
Payer: COMMERCIAL

## 2023-01-09 DIAGNOSIS — M79.605 BILATERAL LEG PAIN: ICD-10-CM

## 2023-01-09 DIAGNOSIS — R29.898 LEFT LEG WEAKNESS: ICD-10-CM

## 2023-01-09 DIAGNOSIS — M79.604 BILATERAL LEG PAIN: ICD-10-CM

## 2023-01-09 PROCEDURE — 72156 MRI NECK SPINE W/O & W/DYE: CPT

## 2023-01-09 PROCEDURE — 255N000002 HC RX 255 OP 636: Performed by: PSYCHIATRY & NEUROLOGY

## 2023-01-09 PROCEDURE — A9585 GADOBUTROL INJECTION: HCPCS | Performed by: PSYCHIATRY & NEUROLOGY

## 2023-01-09 PROCEDURE — 72157 MRI CHEST SPINE W/O & W/DYE: CPT

## 2023-01-09 RX ORDER — GADOBUTROL 604.72 MG/ML
10 INJECTION INTRAVENOUS ONCE
Status: COMPLETED | OUTPATIENT
Start: 2023-01-09 | End: 2023-01-09

## 2023-01-09 RX ADMIN — GADOBUTROL 10 ML: 604.72 INJECTION INTRAVENOUS at 06:01

## 2023-01-25 ENCOUNTER — TELEPHONE (OUTPATIENT)
Dept: NEUROLOGY | Facility: CLINIC | Age: 38
End: 2023-01-25
Payer: COMMERCIAL

## 2023-01-30 ENCOUNTER — OFFICE VISIT (OUTPATIENT)
Dept: NEUROLOGY | Facility: CLINIC | Age: 38
End: 2023-01-30
Payer: COMMERCIAL

## 2023-01-30 VITALS — OXYGEN SATURATION: 98 % | SYSTOLIC BLOOD PRESSURE: 128 MMHG | HEART RATE: 82 BPM | DIASTOLIC BLOOD PRESSURE: 81 MMHG

## 2023-01-30 DIAGNOSIS — M79.605 BILATERAL LEG PAIN: ICD-10-CM

## 2023-01-30 DIAGNOSIS — M79.604 BILATERAL LEG PAIN: ICD-10-CM

## 2023-01-30 DIAGNOSIS — R29.898 LEFT LEG WEAKNESS: Primary | ICD-10-CM

## 2023-01-30 PROCEDURE — 99213 OFFICE O/P EST LOW 20 MIN: CPT | Performed by: PSYCHIATRY & NEUROLOGY

## 2023-01-30 NOTE — PROGRESS NOTES
"Fred Alva is a 37 year old male who presents for:  Chief Complaint   Patient presents with     Follow Up     Leg weakness- Review testing         Initial Vitals:  /81 (BP Location: Left arm, Patient Position: Sitting, Cuff Size: Adult Regular)   Pulse 82   SpO2 98%  Estimated body mass index is 30.85 kg/m  as calculated from the following:    Height as of 11/30/22: 1.778 m (5' 10\").    Weight as of 11/30/22: 97.5 kg (215 lb).. There is no height or weight on file to calculate BSA. BP completed using cuff size: regular        Estrella Ballard    "

## 2023-01-30 NOTE — PROGRESS NOTES
ESTABLISHED PATIENT NEUROLOGY NOTE    DATE OF VISIT: 1/30/2023  CLINIC LOCATION: [facility]  MRN: [id number]  PATIENT NAME: Fred Alva  YOB: 1985    REASON FOR VISIT:   Chief Complaint   Patient presents with     Follow Up     Leg weakness- Review testing      SUBJECTIVE:                                                      HISTORY OF PRESENT ILLNESS: Patient is here to follow up regarding bilateral lower extremity/hip pain and left lower extremity weakness. Please refer to my initial (12/28/2022)/other prior notes for further information.    Since the last visit, the patient reports that he continues to experience bilateral lower extremity pain with intermittent left leg paresthesias.  He denies interval development of new focal neurological symptoms.    Cervical spine MRI from 1/9/2023 did not demonstrate abnormal signal or enhancement in the cervical spinal cord to suggest demyelination, moderate right and mild left neural foraminal narrowing at C5-6, mild right and moderate left neuroforaminal narrowing at C6-C7 noted.  Thoracic spine MRI was negative for abnormal signal or enhancement in thoracic spinal cord, but demonstrated multilevel mild degenerative changes without significant spinal canal or neuroforaminal narrowing at any level.  All images were personally reviewed and independently interpreted.    I also reviewed scanned lumbar spine MRI report from [facility], which demonstrated mild multilevel degenerative changes, including L5-S1 left-sided disc bulging that abutting the S1 nerve root without compression.  EXAM:                                                    Physical Exam:   Vitals: /81 (BP Location: Left arm, Patient Position: Sitting, Cuff Size: Adult Regular)   Pulse 82   SpO2 98%     General: pt is in NAD, cooperative.  Skin: normal turgor, moist mucous membranes, no lesions/rashes noticed.  HEENT: ATNC, white sclera, normal  conjunctiva.  Respiratory: Symmetric lung excursion, no accessory respiratory muscle use.  Abdomen: Non distended.  Neurological: awake, cooperative, follows commands, left leg strength is 5/5 with encouragement.  No other medication changes compared to the previous visit.  ASSESSMENT AND PLAN:                                                    Assessment: 37 year old male patient presents for follow-up of bilateral lower extremity/hip pain and left lower extremity weakness after the completion of cervical and thoracic spine MRI's, which were unrevealing.  Previously we discussed repeating brain MRI, which was normal in 2009 and in 2018.  We also discussed option of EMG.  We reviewed both of these tests today along with option to try physical therapy first and decided to start with physical therapy.  I placed a referral.  If symptoms remain unchanged, might consider doing EMG as the next step followed by brain MRI.    Diagnoses:    ICD-10-CM    1. Left leg weakness  R29.898       2. Bilateral leg pain  M79.604     M79.605         Plan: At today's visit we thoroughly discussed current symptoms, cervical and thoracic spine MRI results, possible future testing (brain MRI and EMG), available treatment options, and the plan, which includes:  Orders Placed This Encounter   Procedures     Physical Therapy Referral     We discussed that for pain he could consider over-the-counter naproxen, but need to take it with food.    We decided to try physical therapy and hold off on additional testing.    Next follow-up appointment is in the next 2 months or earlier if needed.    Total Time: 24 minutes spent on the date of the encounter doing chart review, history and exam, documentation and further activities per the note.    Tonio Larsen MD  [facility]  (Chart documentation was completed in part with Dragon voice-recognition software. Even though reviewed, some grammatical, spelling, and word errors may  remain.)

## 2023-01-30 NOTE — PATIENT INSTRUCTIONS
AFTER VISIT SUMMARY (AVS):    At today's visit we thoroughly discussed current symptoms, cervical and thoracic spine MRI results, possible future testing (brain MRI and EMG), available treatment options, and the plan, which includes:  Orders Placed This Encounter   Procedures    Physical Therapy Referral     We discussed that for pain you could consider over-the-counter naproxen, but need to take it with food.    We decided to try physical therapy and hold off on additional testing.    Next follow-up appointment is in the next 2 months or earlier if needed.    Please do not hesitate to call me with any questions or concerns.    Thanks.

## 2023-01-30 NOTE — LETTER
1/30/2023         RE: Fred Alva  [address]  [facility] [address]        Dear Colleague,    Thank you for referring your patient, Fred Alva, to the [facility] [place]. Please see a copy of my visit note below.    ESTABLISHED PATIENT NEUROLOGY NOTE    DATE OF VISIT: 1/30/2023  CLINIC LOCATION: [facility]  MRN: [id number]  PATIENT NAME: Fred Alva  YOB: 1985    REASON FOR VISIT:   Chief Complaint   Patient presents with     Follow Up     Leg weakness- Review testing      SUBJECTIVE:                                                      HISTORY OF PRESENT ILLNESS: Patient is here to follow up regarding bilateral lower extremity/hip pain and left lower extremity weakness. Please refer to my initial (12/28/2022)/other prior notes for further information.    Since the last visit, the patient reports that he continues to experience bilateral lower extremity pain with intermittent left leg paresthesias.  He denies interval development of new focal neurological symptoms.    Cervical spine MRI from 1/9/2023 did not demonstrate abnormal signal or enhancement in the cervical spinal cord to suggest demyelination, moderate right and mild left neural foraminal narrowing at C5-6, mild right and moderate left neuroforaminal narrowing at C6-C7 noted.  Thoracic spine MRI was negative for abnormal signal or enhancement in thoracic spinal cord, but demonstrated multilevel mild degenerative changes without significant spinal canal or neuroforaminal narrowing at any level.  All images were personally reviewed and independently interpreted.    I also reviewed scanned lumbar spine MRI report from [facility], which demonstrated mild multilevel degenerative changes, including L5-S1 left-sided disc bulging that abutting the S1 nerve root without compression.  EXAM:                                                    Physical Exam:    Vitals: /81 (BP Location: Left arm, Patient Position: Sitting, Cuff Size: Adult Regular)   Pulse 82   SpO2 98%     General: pt is in NAD, cooperative.  Skin: normal turgor, moist mucous membranes, no lesions/rashes noticed.  HEENT: ATNC, white sclera, normal conjunctiva.  Respiratory: Symmetric lung excursion, no accessory respiratory muscle use.  Abdomen: Non distended.  Neurological: awake, cooperative, follows commands, left leg strength is 5/5 with encouragement.  No other medication changes compared to the previous visit.  ASSESSMENT AND PLAN:                                                    Assessment: 37 year old male patient presents for follow-up of bilateral lower extremity/hip pain and left lower extremity weakness after the completion of cervical and thoracic spine MRI's, which were unrevealing.  Previously we discussed repeating brain MRI, which was normal in 2009 and in 2018.  We also discussed option of EMG.  We reviewed both of these tests today along with option to try physical therapy first and decided to start with physical therapy.  I placed a referral.  If symptoms remain unchanged, might consider doing EMG as the next step followed by brain MRI.    Diagnoses:    ICD-10-CM    1. Left leg weakness  R29.898       2. Bilateral leg pain  M79.604     M79.605         Plan: At today's visit we thoroughly discussed current symptoms, cervical and thoracic spine MRI results, possible future testing (brain MRI and EMG), available treatment options, and the plan, which includes:  Orders Placed This Encounter   Procedures     Physical Therapy Referral     We discussed that for pain he could consider over-the-counter naproxen, but need to take it with food.    We decided to try physical therapy and hold off on additional testing.    Next follow-up appointment is in the next 2 months or earlier if needed.    Total Time: 24 minutes spent on the date of the encounter doing chart review, history and  "exam, documentation and further activities per the note.    Tonio Larsen MD  [facility]  (Chart documentation was completed in part with Dragon voice-recognition software. Even though reviewed, some grammatical, spelling, and word errors may remain.)      Fred Alva is a 37 year old male who presents for:  Chief Complaint   Patient presents with     Follow Up     Leg weakness- Review testing         Initial Vitals:  /81 (BP Location: Left arm, Patient Position: Sitting, Cuff Size: Adult Regular)   Pulse 82   SpO2 98%  Estimated body mass index is 30.85 kg/m  as calculated from the following:    Height as of 11/30/22: 1.778 m (5' 10\").    Weight as of 11/30/22: 97.5 kg (215 lb).. There is no height or weight on file to calculate BSA. BP completed using cuff size: regular        Estrella Ballard        Again, thank you for allowing me to participate in the care of your patient.        Sincerely,        Tonio Larsen MD    "

## 2023-02-09 ENCOUNTER — THERAPY VISIT (OUTPATIENT)
Dept: PHYSICAL THERAPY | Facility: CLINIC | Age: 38
End: 2023-02-09
Attending: PSYCHIATRY & NEUROLOGY
Payer: COMMERCIAL

## 2023-02-09 DIAGNOSIS — R29.898 LEFT LEG WEAKNESS: ICD-10-CM

## 2023-02-09 DIAGNOSIS — M79.604 BILATERAL LEG PAIN: ICD-10-CM

## 2023-02-09 DIAGNOSIS — M79.605 BILATERAL LEG PAIN: ICD-10-CM

## 2023-02-09 DIAGNOSIS — G89.29 CHRONIC LEFT-SIDED LOW BACK PAIN WITH LEFT-SIDED SCIATICA: ICD-10-CM

## 2023-02-09 DIAGNOSIS — M54.42 CHRONIC LEFT-SIDED LOW BACK PAIN WITH LEFT-SIDED SCIATICA: ICD-10-CM

## 2023-02-09 PROBLEM — M54.40 CHRONIC LEFT-SIDED LOW BACK PAIN WITH SCIATICA: Status: ACTIVE | Noted: 2023-02-09

## 2023-02-09 PROCEDURE — 97110 THERAPEUTIC EXERCISES: CPT | Mod: GP | Performed by: PHYSICAL THERAPIST

## 2023-02-09 PROCEDURE — 97162 PT EVAL MOD COMPLEX 30 MIN: CPT | Mod: GP | Performed by: PHYSICAL THERAPIST

## 2023-02-09 NOTE — PROGRESS NOTES
Physical Therapy Initial Evaluation  Subjective:  Pt reports that he started having LBP that worsened - he lost balance twice due to tingling at leg.  MRI was done and showed a little degeneration.  A second MRI was done recently with some disc issues and degeneration.    The history is provided by the patient. No  was used.   Patient Health History  Fred Alva being seen for Back pain, .     Problem began: 6/1/2022.   Problem occurred: insiduous   Pain is reported as 5/10 on pain scale.  General health as reported by patient is good.  Pertinent medical history includes: seizures and smoking.        Surgeries include:  Other. Other surgery history details: RC surgery back in [facility].    Current medications:  Anti-seizure medication.    Current occupation is Education.   Primary job tasks include:  Prolonged sitting, pushing/pulling and prolonged standing.                  Therapist Generated HPI Evaluation         Type of problem:  Lumbar.    This is a chronic condition.  Condition occurred with:  Insidious onset.    Patient reports pain:  Lumbar spine right and lumbar spine left.  Pain is described as shooting and aching and is intermittent and constant (constant ache/pressure at low back).  Pain radiates to:  Thigh left.   Since onset symptoms are gradually improving.  Associated symptoms:  Numbness, tingling and loss of motion/stiffness (intermittent numbness/tingling into L thigh and rarely into L LE). Symptoms are exacerbated by standing, walking, sitting and bending  and relieved by NSAID's (high level of ibuprofen, 500 mg naproxen prescribed (hasn't gotten yet)).  Special tests included:  MRI (L5S1 disc bulge - see epic).    Restrictions due to condition include:  Working in normal job without restrictions.  Barriers include:  None as reported by patient.                        Objective:  System         Lumbar/SI Evaluation    Lumbar Myotomes:    T12-L3 (Hip Flex):  Left: 4+     Right: 5  L2-4 (Quads):  Left:  4+    Right:  5  L4 (Ankle DF):  Left:  5    Right:  5  L5 (Great Toe Ext): Left: 5    Right: 5   S1 (Toe Raise):  Left: 5-    Right: 5                                                                 Adri Lumbar Evaluation    Posture:  Sitting: poor  Standing: good  Lordosis: WNL  Lateral Shift: no  Correction of Posture: no effect  Other Observations: Pt reported after stretches that the lumbar roll felt better  Movement Loss:  Flexion (Flex): nil and pain  Extension (EXT): mod  Side Glide R (SG R): mod  Side Glide L (SG L): mod  Test Movements:      MARTY: During: decreases  After: better  Mechanical Response: no effect  Repeat MARTY: During: decreases  After: better  Mechanical Response: no effect  EIL: During: increases  After: no worse  Mechanical Response: no effect  Repeat EIL: During: increases  After: no worse  Mechanical Response: no effect        Conclusion: other  Principle of Treatment:  Posture Correction: postural education/body mechanics education        Other: lumbar streches, core strengthening/functional strengthening                                       ROS    Assessment/Plan:    Patient is a 37 year old male with lumbar complaints.    Patient has the following significant findings with corresponding treatment plan.                Diagnosis 1:  LBP w/L LE radiculopathy  Pain -  directional preference exercise and home program  Decreased ROM/flexibility - manual therapy, therapeutic exercise and home program  Decreased strength - therapeutic exercise, therapeutic activities and home program  Impaired muscle performance - neuro re-education and home program  Decreased function - therapeutic activities and home program  Impaired posture - neuro re-education and home program    Therapy Evaluation Codes:   1) History comprised of:   Personal factors that impact the plan of care:      Profession and Time since onset of symptoms.    Comorbidity factors that impact the  plan of care are:      Seizures, Smoking and Weakness.     Medications impacting care: Anti-inflammatory.  2) Examination of Body Systems comprised of:   Body structures and functions that impact the plan of care:      Lumbar spine.   Activity limitations that impact the plan of care are:      Dressing, Lifting, Reading/Computer work, Sitting, Standing and Walking.  3) Clinical presentation characteristics are:   Evolving/Changing.  4) Decision-Making    Moderate complexity using standardized patient assessment instrument and/or measureable assessment of functional outcome.  Cumulative Therapy Evaluation is: Moderate complexity.    Previous and current functional limitations:  (See Goal Flow Sheet for this information)    Short term and Long term goals: (See Goal Flow Sheet for this information)     Communication ability:  Patient appears to be able to clearly communicate and understand verbal and written communication and follow directions correctly.  Treatment Explanation - The following has been discussed with the patient:   RX ordered/plan of care  Anticipated outcomes  Possible risks and side effects  This patient would benefit from PT intervention to resume normal activities.   Rehab potential is good.    Frequency:  1 X week, once daily  Duration:  for 6-8 weeks  Discharge Plan:  Achieve all LTG.  Independent in home treatment program.  Reach maximal therapeutic benefit.    Please refer to the daily flowsheet for treatment today, total treatment time and time spent performing 1:1 timed codes.

## 2023-02-14 ENCOUNTER — TELEPHONE (OUTPATIENT)
Dept: NEUROLOGY | Facility: CLINIC | Age: 38
End: 2023-02-14

## 2023-02-16 ENCOUNTER — THERAPY VISIT (OUTPATIENT)
Dept: PHYSICAL THERAPY | Facility: CLINIC | Age: 38
End: 2023-02-16
Payer: COMMERCIAL

## 2023-02-16 DIAGNOSIS — M54.42 CHRONIC LEFT-SIDED LOW BACK PAIN WITH LEFT-SIDED SCIATICA: Primary | ICD-10-CM

## 2023-02-16 DIAGNOSIS — G89.29 CHRONIC LEFT-SIDED LOW BACK PAIN WITH LEFT-SIDED SCIATICA: Primary | ICD-10-CM

## 2023-02-16 PROCEDURE — 97530 THERAPEUTIC ACTIVITIES: CPT | Mod: GP | Performed by: PHYSICAL THERAPIST

## 2023-02-16 PROCEDURE — 97110 THERAPEUTIC EXERCISES: CPT | Mod: GP | Performed by: PHYSICAL THERAPIST

## 2023-02-16 NOTE — PROGRESS NOTES
"PROGRESS  REPORT    Progress reporting period is from 2/9/2023 to 2/16/2023.       SUBJECTIVE  Subjective changes noted by patient:  Pt reports that he hasn't had as many \"zingers\" as before.  He was doing a lot of sitting - at [employer] he was sitting and then in the car driving to and from [employer] and that continues to aggravate his back.  He feels that the stretches have been helping.  He is going to try to manage his symptoms with his HEP at this point independently and return for additional PT only if symptoms don't continue to improve or if they exacerbate.  Changes in function:  Yes (See Goal flowsheet attached for changes in current functional level)  Adverse reaction to treatment or activity: None    OBJECTIVE  Changes noted in objective findings:  Yes, see below.  Objective: Currently he has stiffness and slight pain in his back.  Lumbar AROM flex WNL, ext min loss, B SG min loss.  MMT R hip flex 5/5, L hip flex 5-/5, R knee ext 5/5, L knee ext 5-/5, B DF 5/5, B gastroc 5/5 (altho pt reported feeling tired on L).     ASSESSMENT/PLAN  Updated problem list and treatment plan: Diagnosis 1:  LBP  Pain -  directional preference exercise and home program  Decreased ROM/flexibility - therapeutic exercise and home program  Impaired muscle performance - neuro re-education and home program  Decreased function - therapeutic activities and home program  Impaired posture - neuro re-education and home program  STG/LTGs have been met or progress has been made towards goals:  Yes (See Goal flow sheet completed today.)  Assessment of Progress: The patient's condition is improving.  The patient's condition has potential to improve.  Self Management Plans:  Patient is independent in a home treatment program.  I have re-evaluated this patient and find that the nature, scope, duration and intensity of the therapy is appropriate for the medical condition of the patient.  Fred continues to require the following " intervention to meet STG and LTG's:  Pt will try to manage sx independently at this point.    Recommendations:  Pt will try to manage sx independently at this point and return for additional PT only if symptoms don't continue to improve or if sx exacerbate.    Please refer to the daily flowsheet for treatment today, total treatment time and time spent performing 1:1 timed codes.

## 2023-02-22 NOTE — TELEPHONE ENCOUNTER
DMV form signed, faxed and mailed  to [employer] on 02/22/2023, sent to scanning, and copy mailed to patient.

## 2023-05-15 ENCOUNTER — THERAPY VISIT (OUTPATIENT)
Dept: PHYSICAL THERAPY | Facility: CLINIC | Age: 38
End: 2023-05-15
Payer: COMMERCIAL

## 2023-05-15 DIAGNOSIS — G89.29 CHRONIC LEFT-SIDED LOW BACK PAIN WITH LEFT-SIDED SCIATICA: Primary | ICD-10-CM

## 2023-05-15 DIAGNOSIS — M54.42 CHRONIC LEFT-SIDED LOW BACK PAIN WITH LEFT-SIDED SCIATICA: Primary | ICD-10-CM

## 2023-05-15 PROCEDURE — 97110 THERAPEUTIC EXERCISES: CPT | Mod: GP | Performed by: PHYSICAL THERAPIST

## 2023-05-15 NOTE — PROGRESS NOTES
PROGRESS  REPORT    Progress reporting period is from 2/16/2023 to 5/15/2023.       SUBJECTIVE  Subjective changes noted by patient:  Pt has returned to PT due to an exacerbation of LBP.  He reports that 3 weeks ago he stood up and was in incredible pain that shot from the middle of his back to B hips.  He went to Urgent Care and was given a medrol pack and took a week off from work, which helped with his pain.  Then he went to [facility] and they think it may be an annular fissure.  He was using a SEC off and on before, but now he needs to use it more.  He now gets pain at low back and B hips - the pain is constant at varying levels.  No numbness/tingling this time.  He hasn't noticed any weakness.  He hasn't tried any of the exercises from last time because he was afraid that it might go badly.  The other difference between this episode and last time is that this time he gets increased pain going from sit to stand.  He also has a worse sitting tolerance.  He has pain with walking, but he is able to go 1/2 hour with his SEC.  The most comfortable position is lying flat on his back or on his side.  At worst his pain is an 8/10 and best is a 5/10.  Pain is worse in the morning.  He is taking muscle relaxers and extra strength tylenol for pain, which doesn't help much.    Current pain level is 8/10  .     Previous pain level was  3/10  .   Changes in function:  Yes (See Goal flowsheet attached for changes in current functional level) and New goals set.  Adverse reaction to treatment or activity: activity - prolonged sitting, going from sit to stand (all transitional movements), and prolonged standing.    OBJECTIVE  Changes noted in objective findings:  Yes, see below.  Objective: MMT R knee ext 5/5, L knee ext 5-/5, R knee flex 5/5, L knee flex 4+/5, B DF 5/5, L PF 5/5, R PF 4+/5.  Slump test (+) B with symptoms of increased tightness/pain in low back.  lumbar AROM flex to knees (increased pain at low back with radiation  into B hips), ext mod loss (feels better going this way), B SG mod loss (-).  After prone positioning and SHAHRZAD stretch, decreased pain at low back (5/10) and no radiation of pain into hips.  Also improved lumbar AROM flex to shins and B SG min loss - trial for HEP to test for lumbar derangement.     ASSESSMENT/PLAN  Updated problem list and treatment plan: Diagnosis 1:  LBP secondary to possible derangement  Pain -  directional preference exercise and home program  Decreased ROM/flexibility - manual therapy, therapeutic exercise and home program  Impaired muscle performance - neuro re-education and home program  Decreased function - therapeutic activities and home program  Impaired posture - neuro re-education and home program  STG/LTGs have been met or progress has been made towards goals:  New goals set due to exacerbation of LBP.  Assessment of Progress: The patient's condition has exacerbated.  Self Management Plans:  Patient has been instructed in a home treatment program.  I have re-evaluated this patient and find that the nature, scope, duration and intensity of the therapy is appropriate for the medical condition of the patient.  Fred continues to require the following intervention to meet STG and LTG's:  PT    Recommendations:  This patient would benefit from continued therapy.     Frequency:  1 X week, once daily  Duration:  for 6 weeks        Please refer to the daily flowsheet for treatment today, total treatment time and time spent performing 1:1 timed codes.

## 2023-05-19 DIAGNOSIS — G40.109 FOCAL EPILEPSY (H): ICD-10-CM

## 2023-05-19 RX ORDER — LAMOTRIGINE 200 MG/1
200 TABLET ORAL 2 TIMES DAILY
Qty: 186 TABLET | Refills: 3 | Status: CANCELLED | OUTPATIENT
Start: 2023-05-19

## 2023-05-22 NOTE — TELEPHONE ENCOUNTER
Detailed voicemail left for patient with instructions for the patient to speak to their pharmacist for the prescription that was sent over on 12/20/22 with a one year supply.     lamoTRIgine (LAMICTAL) 200 MG tablet 186 tablet 3 12/20/2022  No   Sig - Route: Take 1 tablet (200 mg) by mouth 2 times daily - Oral   Sent to pharmacy as: lamoTRIgine 200 MG Oral Tablet (LaMICtal)   Class: E-Prescribe   Order: [id number]   E-Prescribing Status: Receipt confirmed by pharmacy (12/20/2022 12:28 PM CST)     Printout Tracking    External Result Report     Pharmacy    [id number] - [facility] [address] [place] [address]

## 2023-05-24 ENCOUNTER — THERAPY VISIT (OUTPATIENT)
Dept: PHYSICAL THERAPY | Facility: CLINIC | Age: 38
End: 2023-05-24
Payer: COMMERCIAL

## 2023-05-24 DIAGNOSIS — M54.42 CHRONIC LEFT-SIDED LOW BACK PAIN WITH LEFT-SIDED SCIATICA: Primary | ICD-10-CM

## 2023-05-24 DIAGNOSIS — G89.29 CHRONIC LEFT-SIDED LOW BACK PAIN WITH LEFT-SIDED SCIATICA: Primary | ICD-10-CM

## 2023-05-24 PROCEDURE — 97110 THERAPEUTIC EXERCISES: CPT | Mod: GP | Performed by: PHYSICAL THERAPIST

## 2023-06-02 ENCOUNTER — HEALTH MAINTENANCE LETTER (OUTPATIENT)
Age: 38
End: 2023-06-02

## 2023-06-12 ENCOUNTER — THERAPY VISIT (OUTPATIENT)
Dept: PHYSICAL THERAPY | Facility: CLINIC | Age: 38
End: 2023-06-12
Payer: COMMERCIAL

## 2023-06-12 DIAGNOSIS — G89.29 CHRONIC LEFT-SIDED LOW BACK PAIN WITH LEFT-SIDED SCIATICA: Primary | ICD-10-CM

## 2023-06-12 DIAGNOSIS — M54.42 CHRONIC LEFT-SIDED LOW BACK PAIN WITH LEFT-SIDED SCIATICA: Primary | ICD-10-CM

## 2023-06-12 PROCEDURE — 97530 THERAPEUTIC ACTIVITIES: CPT | Mod: GP | Performed by: PHYSICAL THERAPIST

## 2023-06-12 PROCEDURE — 97110 THERAPEUTIC EXERCISES: CPT | Mod: GP | Performed by: PHYSICAL THERAPIST

## 2023-06-12 NOTE — PROGRESS NOTES
DISCHARGE  Reason for Discharge: Patient has met all goals.    Equipment Issued: none    Discharge Plan: Patient to continue home program.       06/12/23 0500   Appointment Info   Signing clinician's name / credentials Marisol Mitchell DPT, Cert. MDT   Total/Authorized Visits 6-8 (E&T)   Visits Used 5   Medical Diagnosis Left leg weakness  Bilateral leg pain   PT Tx Diagnosis Chronic left sided low back pain with sciatica   Progress Note/Certification   Onset of illness/injury or Date of Surgery 04/24/23   Therapy Frequency 1x/week   Predicted Duration 6 weeks   Progress Note Completed Date 06/12/23   PT Goal 1   Goal Identifier Sitting   Goal Description Pt will be able to sit for 1 hour with 0-2/10 concordant LBP   Rationale to maximize safety and independence with transportation;to maximize safety and independence within the community   Goal Progress cannot sit more than one hour   Target Date 06/26/23   Date Met 06/12/23   Subjective Report   Subjective Report Pt reports that he has been able to walk around with less pain.  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  But he was able to recover quickly.  He has been doing his press-ups a few times/day every day.   Objective Measures   Objective Measures Objective Measure 1;Objective Measure 2   Objective Measure 1   Objective Measure myotome strength   Details All LE myotoms, B 5/5   Objective Measure 2   Objective Measure Lumbar AROM   Details lumbar AROM flex WNL, ext min loss, B SG WNL.   Treatment Interventions (PT)   Interventions Therapeutic Procedure/Exercise;Neuromuscular Re-education;Therapeutic Activity   Therapeutic Procedure/Exercise   Therapeutic Procedures: strength, endurance, ROM, flexibillity minutes (69354) 20   Therapeutic Procedures Ther Proc 2;Ther Proc 3;Ther Proc 4;Ther Proc 5   Ther Proc 1 double knee to chest   Ther Proc 1 - Details no  pain during or after - ROM normal and pain-free afterwards   Ther Proc 2 press-ups   Ther Proc 2 - Details rev x10 reps   Ther Proc 3 abd set   Ther Proc 3 - Details x10 reps - VC for TA activation without doing pelvic tilt due to h/o annular tear so will keep in neutral for now - no pain during - HEP   Ther Proc 4 bridge Ag   Ther Proc 4 - Details can try next week if he can activate TA well and if this is pain-free   Ther Proc 5 abd #4   Ther Proc 5 - Details can try this exercise in a week if he continues to have no LBP/B hip pain   Therapeutic Activity   Therapeutic Activities: dynamic activities to improve functional performance minutes (79747) 10   Ther Act 1 Return to function   Ther Act 1 - Details Educated pt on trying double knee to chest in the afternoon after he has 3-4 days with no episodes of back/hip pain.  Also educated pt on doing a set of 10 repetitions in supine of knee to chest followed by 10 reps of press-ups and assess for pain. Discussed doing this 2x/day over a period of a week to see if no pain with double knee to chest.  Also discussed how it is ok to kayak or canoe as long as he doesn't have to carry it.  Also discussed how it will be hard to sit without giong into lumbar flex so to try to keep posture in neutral as much as able and do press-ups before/after kayaking.  Educated him to go for a short session of kayaking and then assess how his back did - progressing distance as tolerated.   Total Session Time   Timed Code Treatment Minutes 30   Total Treatment Time (sum of timed and untimed services) 30         Referring Provider:  Tonio Ardon*

## 2023-12-21 ENCOUNTER — OFFICE VISIT (OUTPATIENT)
Dept: FAMILY MEDICINE | Facility: CLINIC | Age: 38
End: 2023-12-21
Payer: COMMERCIAL

## 2023-12-21 VITALS
HEIGHT: 69 IN | DIASTOLIC BLOOD PRESSURE: 82 MMHG | WEIGHT: 230.2 LBS | HEART RATE: 81 BPM | OXYGEN SATURATION: 98 % | BODY MASS INDEX: 34.1 KG/M2 | TEMPERATURE: 97.9 F | SYSTOLIC BLOOD PRESSURE: 136 MMHG | RESPIRATION RATE: 16 BRPM

## 2023-12-21 DIAGNOSIS — F17.200 NICOTINE DEPENDENCE, UNCOMPLICATED, UNSPECIFIED NICOTINE PRODUCT TYPE: ICD-10-CM

## 2023-12-21 DIAGNOSIS — Z83.3 FAMILY HISTORY OF DIABETES MELLITUS: ICD-10-CM

## 2023-12-21 DIAGNOSIS — Z13.220 ENCOUNTER FOR SCREENING FOR LIPID DISORDER: ICD-10-CM

## 2023-12-21 DIAGNOSIS — M54.42 CHRONIC LEFT-SIDED LOW BACK PAIN WITH LEFT-SIDED SCIATICA: ICD-10-CM

## 2023-12-21 DIAGNOSIS — Z23 NEED FOR VACCINATION: ICD-10-CM

## 2023-12-21 DIAGNOSIS — G89.29 CHRONIC LEFT-SIDED LOW BACK PAIN WITH LEFT-SIDED SCIATICA: ICD-10-CM

## 2023-12-21 DIAGNOSIS — R40.0 DAYTIME SOMNOLENCE: ICD-10-CM

## 2023-12-21 DIAGNOSIS — G40.909 NONINTRACTABLE EPILEPSY WITHOUT STATUS EPILEPTICUS, UNSPECIFIED EPILEPSY TYPE (H): ICD-10-CM

## 2023-12-21 DIAGNOSIS — E78.5 DYSLIPIDEMIA: ICD-10-CM

## 2023-12-21 DIAGNOSIS — Z00.00 ROUTINE GENERAL MEDICAL EXAMINATION AT A HEALTH CARE FACILITY: Primary | ICD-10-CM

## 2023-12-21 PROCEDURE — 99406 BEHAV CHNG SMOKING 3-10 MIN: CPT | Mod: 59 | Performed by: INTERNAL MEDICINE

## 2023-12-21 PROCEDURE — 99395 PREV VISIT EST AGE 18-39: CPT | Mod: 25 | Performed by: INTERNAL MEDICINE

## 2023-12-21 PROCEDURE — 90471 IMMUNIZATION ADMIN: CPT | Performed by: INTERNAL MEDICINE

## 2023-12-21 PROCEDURE — 90686 IIV4 VACC NO PRSV 0.5 ML IM: CPT | Performed by: INTERNAL MEDICINE

## 2023-12-21 PROCEDURE — 99214 OFFICE O/P EST MOD 30 MIN: CPT | Mod: 25 | Performed by: INTERNAL MEDICINE

## 2023-12-21 RX ORDER — METHOCARBAMOL 750 MG/1
1 TABLET, FILM COATED ORAL
COMMUNITY
Start: 2023-09-15

## 2023-12-21 RX ORDER — NAPROXEN 500 MG/1
500 TABLET ORAL
COMMUNITY
Start: 2023-04-05

## 2023-12-21 ASSESSMENT — ENCOUNTER SYMPTOMS
HEMATOCHEZIA: 0
SORE THROAT: 0
HEARTBURN: 0
FEVER: 0
DYSURIA: 1
CONSTIPATION: 0
HEADACHES: 0
COUGH: 0
ARTHRALGIAS: 1
NAUSEA: 0
NERVOUS/ANXIOUS: 0
PALPITATIONS: 0
EYE PAIN: 0
JOINT SWELLING: 0
MYALGIAS: 0
HEMATURIA: 0
FREQUENCY: 0
DIARRHEA: 0
WEAKNESS: 0
CHILLS: 0
PARESTHESIAS: 0
DIZZINESS: 0
ABDOMINAL PAIN: 0

## 2023-12-21 ASSESSMENT — PATIENT HEALTH QUESTIONNAIRE - PHQ9
10. IF YOU CHECKED OFF ANY PROBLEMS, HOW DIFFICULT HAVE THESE PROBLEMS MADE IT FOR YOU TO DO YOUR WORK, TAKE CARE OF THINGS AT HOME, OR GET ALONG WITH OTHER PEOPLE: SOMEWHAT DIFFICULT
SUM OF ALL RESPONSES TO PHQ QUESTIONS 1-9: 3
SUM OF ALL RESPONSES TO PHQ QUESTIONS 1-9: 3

## 2023-12-21 ASSESSMENT — PAIN SCALES - GENERAL: PAINLEVEL: NO PAIN (0)

## 2023-12-21 NOTE — PROGRESS NOTES
"SUBJECTIVE:   Fred is a 38 year old, presenting for the following:  Physical        12/21/2023     3:05 PM   Additional Questions   Roomed by Skyla [place]ST[place]       Healthy Habits:     Getting at least 3 servings of Calcium per day:  Yes    Bi-annual eye exam:  NO    Dental care twice a year:  Yes    Sleep apnea or symptoms of sleep apnea:  Daytime drowsiness    Diet:  Regular (no restrictions)    Frequency of exercise:  2-3 days/week    Duration of exercise:  30-45 minutes    Taking medications regularly:  Yes    Medication side effects:  Not applicable    Additional concerns today:  No  Answers submitted by the patient for this visit:  Patient Health Questionnaire (Submitted on 12/21/2023)  If you checked off any problems, how difficult have these problems made it for you to do your work, take care of things at home, or get along with other people?: Somewhat difficult  PHQ9 TOTAL SCORE: 3      Today's PHQ-9 Score:       12/21/2023     3:05 PM   PHQ-9 SCORE   PHQ-9 Total Score 3     Have you ever done Advance Care Planning? (For example, a Health Directive, POLST, or a discussion with a medical provider or your loved ones about your wishes): No, advance care planning information given to patient to review.  Patient declined advance care planning discussion at this time.    Social History     Tobacco Use    Smoking status: Some Days     Packs/day: .5     Types: Cigarettes    Smokeless tobacco: Never    Tobacco comments:     Currently Occassionally once every 2 weeks.    Substance Use Topics    Alcohol use: Yes     Comment: \"it depends, I drink about 3 days a week\"             12/21/2023    12:16 PM   Alcohol Use   Prescreen: >3 drinks/day or >7 drinks/week? Yes   AUDIT SCORE  4         12/21/2023    12:16 PM   AUDIT - Alcohol Use Disorders Identification Test - Reproduced from the World Health Organization Audit 2001 (Second Edition)   1.  How often do you have a drink containing alcohol? 2 to 3 times a week   2.  How many " "drinks containing alcohol do you have on a typical day when you are drinking? 1 or 2   3.  How often do you have five or more drinks on one occasion? Less than monthly   4.  How often during the last year have you found that you were not able to stop drinking once you had started? Never   5.  How often during the last year have you failed to do what was normally expected of you because of drinking? Never   6.  How often during the last year have you needed a first drink in the morning to get yourself going after a heavy drinking session? Never   7.  How often during the last year have you had a feeling of guilt or remorse after drinking? Never   8.  How often during the last year have you been unable to remember what happened the night before because of your drinking? Never   9.  Have you or someone else been injured because of your drinking? No   10. Has a relative, friend, doctor or other health care worker been concerned about your drinking or suggested you cut down? No   TOTAL SCORE 4       Last PSA: No results found for: \"PSA\"    Reviewed orders with patient. Reviewed health maintenance and updated orders accordingly - Yes  Lab work is in process  Labs reviewed in EPIC    Reviewed and updated as needed this visit by clinical staff   Tobacco  Allergies  Meds  Problems  Med Hx  Surg Hx  Fam Hx          Reviewed and updated as needed this visit by Provider   Tobacco  Allergies  Meds  Problems  Med Hx  Surg Hx  Fam Hx         Past Medical History:   Diagnosis Date    Balance problem     Epilepsy (H)       Past Surgical History:   Procedure Laterality Date    ORTHOPEDIC SURGERY      L shoulder rotator repair    SHOULDER SURGERY Left High school    TONSILLECTOMY      TONSILLECTOMY      WISDOM TOOTH EXTRACTION         Review of Systems   Constitutional:  Negative for chills and fever.   HENT:  Negative for congestion, ear pain, hearing loss and sore throat.    Eyes:  Positive for visual disturbance. " "Negative for pain.   Respiratory:  Negative for cough.    Cardiovascular:  Negative for chest pain, palpitations and peripheral edema.   Gastrointestinal:  Negative for abdominal pain, constipation, diarrhea, heartburn, hematochezia and nausea.   Genitourinary:  Positive for dysuria. Negative for frequency, genital sores, hematuria, impotence, penile discharge and urgency.   Musculoskeletal:  Positive for arthralgias. Negative for joint swelling and myalgias.   Skin:  Negative for rash.   Neurological:  Negative for dizziness, weakness, headaches and paresthesias.   Psychiatric/Behavioral:  Negative for mood changes. The patient is not nervous/anxious.      CONSTITUTIONAL: NEGATIVE for fever, chills, change in weight  INTEGUMENTARY/SKIN: NEGATIVE for worrisome rashes, moles or lesions  EYES: NEGATIVE for vision changes or irritation  ENT: NEGATIVE for ear, mouth and throat problems  RESP: NEGATIVE for significant cough or SOB  CV: NEGATIVE for chest pain, palpitations or peripheral edema  GI: NEGATIVE for nausea, abdominal pain, heartburn, or change in bowel habits   male: negative for dysuria, hematuria, decreased urinary stream, erectile dysfunction, urethral discharge  MUSCULOSKELETAL: NEGATIVE for significant arthralgias or myalgia  NEURO: NEGATIVE for weakness, dizziness or paresthesias  PSYCHIATRIC: NEGATIVE for changes in mood or affect    OBJECTIVE:   /82 (BP Location: Left arm, Patient Position: Sitting, Cuff Size: Adult Large)   Pulse 81   Temp 97.9  F (36.6  C) (Tympanic)   Resp 16   Ht 1.75 m (5' 8.9\")   Wt 104.4 kg (230 lb 3.2 oz)   SpO2 98%   BMI 34.10 kg/m      Physical Exam  GENERAL: healthy, alert and no distress  EYES: Eyes grossly normal to inspection, PERRL and conjunctivae and sclerae normal  HENT: ear canals and TM's normal, nose and mouth without ulcers or lesions  NECK: no adenopathy, no asymmetry, masses, or scars and thyroid normal to palpation  RESP: lungs clear to " auscultation - no rales, rhonchi or wheezes  CV: regular rate and rhythm, normal S1 S2, no S3 or S4, no murmur, click or rub, no peripheral edema and peripheral pulses strong  ABDOMEN: soft, nontender, no hepatosplenomegaly, no masses and bowel sounds normal  MS: no gross musculoskeletal defects noted, no edema  SKIN: no suspicious lesions or rashes  NEURO: Normal strength and tone, mentation intact and speech normal  PSYCH: mentation appears normal, affect normal/bright    Diagnostic Test Results:  Labs reviewed in Epic    ASSESSMENT/PLAN:       Assessment and Plan  1. Routine general medical examination at a health care facility  Pt is new to  PCP , Has been following Neurology for Left leg weakness. Last seen them in 2023  He was recommended to follow PT and later plans of EMG followed by MRI brain. Last PT done on 2023 for Chronic left-sided low back pain with left-sided sciatica. 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     Pt is here for Annual physical. Last labs in 2022 with normal CMP, CBC    Last Seizure , 3 yrs back before that 3 yrs and 8 yrs back. No concerns on the DMV aspects at this time and takes the paperwork   - REVIEW OF HEALTH MAINTENANCE PROTOCOL ORDERS  - Lipid panel reflex to direct LDL Non-fasting; Future  - INFLUENZA VACCINE IM > 6 MONTHS VALENT IIV4 (AFLURIA/FLUZONE)  - Comprehensive metabolic panel (BMP + Alb, Alk Phos, ALT, AST, Total. Bili, TP); Future  - CBC with platelets; Future  - Hemoglobin A1c; Future    2. Nonintractable epilepsy without status epilepticus, unspecified epilepsy type (H)  Chronic problem, currently on Lamictal as managed by neurology.  -Last seen them in 2023 with Sx of bilateral lower extremity pain with intermittent left leg paresthesias  -There are future orders of Lamictal seen already on patient chart, but it is going to  before patient's lab only visit which is scheduled as per chart review.  Will go  ahead and place lamotrigine level by me, will follow out to neurology once we receive the results.  Patient understands the plan.  - Lamotrigine Level; Future      3. Chronic left-sided low back pain with left-sided sciatica  Chronic problem, intermittent flareup.  - C-[facility] T - spine MRI from 1/9/2023 negative for any demyelination but mild to moderate neural foraminal narrowing at C5-C6 & C6-C7 Did follow [facility] with past Lumbar spine MRI and Mild DDD at L5-S1.  -Patient is currently on muscle relaxers as needed as managed by [facility] for his back pain along with physical therapy.  Discussed on lowering of threshold for seizures with less relaxers which needs to be taken with care, patient understands the risks and complications and side effects of these medications.    4. Daytime somnolence  New problem, Patient denies any snoring in the night as he does not know.  Will update dose if it is positive so that we can plan on sleep studies.  -Shared decision to follow sleep hygiene as mentioned on his after visit summary, will check thyroid function and do further recommendations.  - TSH with free T4 reflex; Future    5. Encounter for screening for lipid disorder  - Lipid panel reflex to direct LDL Non-fasting; Future    6. Family history of diabetes mellitus  - Hemoglobin A1c; Future    7. Need for vaccination  - INFLUENZA VACCINE IM > 6 MONTHS VALENT IIV4 (AFLURIA/FLUZONE)    8. Nicotine dependence, uncomplicated, unspecified nicotine product type  Patient was counseled on smoking cessation, we discussed the benefits of quitting which he endorses that Currently Occassionally once every 2 weeks.  Patient will try to quit this on his own and not operating any pneumonia vaccine offered today .Also encouraged to set a stop date.  - SMOKING CESSATION COUNSELING 3-10 MIN    9. Dyslipidemia  New problem,unsure if this is worsening compared to the past since it was not checked in the past.Will start on low intensity statin and  follow up instructions given on mychart result note.  - simvastatin (ZOCOR) 10 MG tablet; Take 1 tablet (10 mg) by mouth at bedtime  Dispense: 90 tablet; Refill: 1        Please note that this note consists of symbols derived from keyboarding, dictation and/or voice recognition software. As a result, there may be errors in the script that have gone undetected. Please consider this when interpreting information found in this chart.    Patient Instructions   As discussed, please do fasting LAB only appointment     Please follow up with Neurology on Lamotrigine management and also DMV paperwork.     =============================    Preventive Health Recommendations  Male Ages 26 - 39    Yearly exam:             See your health care provider every year in order to  o   Review health changes.   o   Discuss preventive care.    o   Review your medicines if your doctor has prescribed any.  You should be tested each year for STDs (sexually transmitted diseases), if you re at risk.   After age 35, talk to your provider about cholesterol testing. If you are at risk for heart disease, have your cholesterol tested at least every 5 years.   If you are at risk for diabetes, you should have a diabetes test (fasting glucose).  Shots: Get a flu shot each year. Get a tetanus shot every 10 years.     Nutrition:  Eat at least 5 servings of fruits and vegetables daily.   Eat whole-grain bread, whole-wheat pasta and brown rice instead of white grains and rice.   Get adequate Calcium and Vitamin D.     Lifestyle  Exercise for at least 150 minutes a week (30 minutes a day, 5 days a week). This will help you control your weight and prevent disease.   Limit alcohol to one drink per day.   No smoking.   Wear sunscreen to prevent skin cancer.   See your dentist every six months for an exam and cleaning.     Return in about 1 year (around 12/21/2024), or if symptoms worsen or fail to improve, for Preventative Visit.    MD PRESTON Willett  [facility] [place]        Patient has been advised of split billing requirements and indicates understanding: Yes      COUNSELING:   Reviewed preventive health counseling, as reflected in patient instructions  Special attention given to:        Regular exercise       Healthy diet/nutrition       Vision screening       Hearing screening       Immunizations  Vaccinated for: Influenza and Declined: Covid-19 and Pneumococcal due to Concerns about side effects/safety             Alcohol Use         He reports that he has been smoking cigarettes. He has been smoking an average of 0.5 packs per day. He has never used smokeless tobacco.  Nicotine/Tobacco Cessation Plan:   Information offered: Patient not interested at this time            MD PRESTON Willett [facility] [place]

## 2023-12-21 NOTE — PATIENT INSTRUCTIONS
As discussed, please do fasting LAB only appointment     Please follow up with Neurology on Lamotrigine management and also DMV paperwork.     =============================    Preventive Health Recommendations  Male Ages 26 - 39    Yearly exam:             See your health care provider every year in order to  o   Review health changes.   o   Discuss preventive care.    o   Review your medicines if your doctor has prescribed any.  You should be tested each year for STDs (sexually transmitted diseases), if you re at risk.   After age 35, talk to your provider about cholesterol testing. If you are at risk for heart disease, have your cholesterol tested at least every 5 years.   If you are at risk for diabetes, you should have a diabetes test (fasting glucose).  Shots: Get a flu shot each year. Get a tetanus shot every 10 years.     Nutrition:  Eat at least 5 servings of fruits and vegetables daily.   Eat whole-grain bread, whole-wheat pasta and brown rice instead of white grains and rice.   Get adequate Calcium and Vitamin D.     Lifestyle  Exercise for at least 150 minutes a week (30 minutes a day, 5 days a week). This will help you control your weight and prevent disease.   Limit alcohol to one drink per day.   No smoking.   Wear sunscreen to prevent skin cancer.   See your dentist every six months for an exam and cleaning.

## 2023-12-29 ENCOUNTER — LAB (OUTPATIENT)
Dept: LAB | Facility: CLINIC | Age: 38
End: 2023-12-29
Payer: COMMERCIAL

## 2023-12-29 DIAGNOSIS — Z11.59 NEED FOR HEPATITIS C SCREENING TEST: ICD-10-CM

## 2023-12-29 DIAGNOSIS — Z13.220 ENCOUNTER FOR SCREENING FOR LIPID DISORDER: ICD-10-CM

## 2023-12-29 DIAGNOSIS — Z00.00 ROUTINE GENERAL MEDICAL EXAMINATION AT A HEALTH CARE FACILITY: ICD-10-CM

## 2023-12-29 DIAGNOSIS — G40.909 NONINTRACTABLE EPILEPSY WITHOUT STATUS EPILEPTICUS, UNSPECIFIED EPILEPSY TYPE (H): ICD-10-CM

## 2023-12-29 DIAGNOSIS — R40.0 DAYTIME SOMNOLENCE: ICD-10-CM

## 2023-12-29 DIAGNOSIS — Z11.4 SCREENING FOR HIV (HUMAN IMMUNODEFICIENCY VIRUS): Primary | ICD-10-CM

## 2023-12-29 DIAGNOSIS — Z83.3 FAMILY HISTORY OF DIABETES MELLITUS: ICD-10-CM

## 2023-12-29 LAB
ALBUMIN SERPL BCG-MCNC: 4.7 G/DL (ref 3.5–5.2)
ALP SERPL-CCNC: 52 U/L (ref 40–150)
ALT SERPL W P-5'-P-CCNC: 41 U/L (ref 0–70)
ANION GAP SERPL CALCULATED.3IONS-SCNC: 11 MMOL/L (ref 7–15)
AST SERPL W P-5'-P-CCNC: 35 U/L (ref 0–45)
BILIRUB SERPL-MCNC: 0.7 MG/DL
BUN SERPL-MCNC: 10.2 MG/DL (ref 6–20)
CALCIUM SERPL-MCNC: 9.3 MG/DL (ref 8.6–10)
CHLORIDE SERPL-SCNC: 105 MMOL/L (ref 98–107)
CHOLEST SERPL-MCNC: 228 MG/DL
CREAT SERPL-MCNC: 0.81 MG/DL (ref 0.67–1.17)
DEPRECATED HCO3 PLAS-SCNC: 24 MMOL/L (ref 22–29)
EGFRCR SERPLBLD CKD-EPI 2021: >90 ML/MIN/1.73M2
ERYTHROCYTE [DISTWIDTH] IN BLOOD BY AUTOMATED COUNT: 11.3 % (ref 10–15)
FASTING STATUS PATIENT QL REPORTED: YES
GLUCOSE SERPL-MCNC: 94 MG/DL (ref 70–99)
HBA1C MFR BLD: 5.4 % (ref 0–5.6)
HCT VFR BLD AUTO: 43.9 % (ref 40–53)
HCV AB SERPL QL IA: NONREACTIVE
HDLC SERPL-MCNC: 33 MG/DL
HGB BLD-MCNC: 15.4 G/DL (ref 13.3–17.7)
HIV 1+2 AB+HIV1 P24 AG SERPL QL IA: NONREACTIVE
LDLC SERPL CALC-MCNC: 151 MG/DL
MCH RBC QN AUTO: 31.7 PG (ref 26.5–33)
MCHC RBC AUTO-ENTMCNC: 35.1 G/DL (ref 31.5–36.5)
MCV RBC AUTO: 90 FL (ref 78–100)
NONHDLC SERPL-MCNC: 195 MG/DL
PLATELET # BLD AUTO: 242 10E3/UL (ref 150–450)
POTASSIUM SERPL-SCNC: 4.1 MMOL/L (ref 3.4–5.3)
PROT SERPL-MCNC: 7.3 G/DL (ref 6.4–8.3)
RBC # BLD AUTO: 4.86 10E6/UL (ref 4.4–5.9)
SODIUM SERPL-SCNC: 140 MMOL/L (ref 135–145)
TRIGL SERPL-MCNC: 222 MG/DL
TSH SERPL DL<=0.005 MIU/L-ACNC: 1.79 UIU/ML (ref 0.3–4.2)
WBC # BLD AUTO: 6.7 10E3/UL (ref 4–11)

## 2023-12-29 PROCEDURE — 36415 COLL VENOUS BLD VENIPUNCTURE: CPT

## 2023-12-29 PROCEDURE — 87389 HIV-1 AG W/HIV-1&-2 AB AG IA: CPT

## 2023-12-29 PROCEDURE — 83036 HEMOGLOBIN GLYCOSYLATED A1C: CPT

## 2023-12-29 PROCEDURE — 99000 SPECIMEN HANDLING OFFICE-LAB: CPT

## 2023-12-29 PROCEDURE — 80175 DRUG SCREEN QUAN LAMOTRIGINE: CPT | Mod: 90

## 2023-12-29 PROCEDURE — 86803 HEPATITIS C AB TEST: CPT

## 2023-12-29 PROCEDURE — 80053 COMPREHEN METABOLIC PANEL: CPT

## 2023-12-29 PROCEDURE — 84443 ASSAY THYROID STIM HORMONE: CPT

## 2023-12-29 PROCEDURE — 80061 LIPID PANEL: CPT

## 2023-12-29 PROCEDURE — 85027 COMPLETE CBC AUTOMATED: CPT

## 2023-12-30 LAB — LAMOTRIGINE SERPL-MCNC: 5.7 UG/ML

## 2024-01-01 RX ORDER — SIMVASTATIN 10 MG
10 TABLET ORAL AT BEDTIME
Qty: 90 TABLET | Refills: 1 | Status: SHIPPED | OUTPATIENT
Start: 2024-01-01 | End: 2024-04-04

## 2024-02-20 ENCOUNTER — VIRTUAL VISIT (OUTPATIENT)
Dept: NEUROLOGY | Facility: CLINIC | Age: 39
End: 2024-02-20
Payer: COMMERCIAL

## 2024-02-20 VITALS — HEIGHT: 69 IN | WEIGHT: 220 LBS | BODY MASS INDEX: 32.58 KG/M2

## 2024-02-20 DIAGNOSIS — G40.109 FOCAL EPILEPSY (H): ICD-10-CM

## 2024-02-20 RX ORDER — LAMOTRIGINE 200 MG/1
200 TABLET ORAL 2 TIMES DAILY
Qty: 186 TABLET | Refills: 3 | Status: SHIPPED | OUTPATIENT
Start: 2024-02-20

## 2024-02-20 ASSESSMENT — PAIN SCALES - GENERAL: PAINLEVEL: NO PAIN (0)

## 2024-02-20 ASSESSMENT — PATIENT HEALTH QUESTIONNAIRE - PHQ9: SUM OF ALL RESPONSES TO PHQ QUESTIONS 1-9: 4

## 2024-02-20 NOTE — NURSING NOTE
Is the patient currently in the state of [place]? YES    Visit mode:VIDEO    If the visit is dropped, the patient can be reconnected by: VIDEO VISIT: Send to e-mail at: [email or web address]    Will anyone else be joining the visit? NO  (If patient encounters technical issues they should call [phone])    How would you like to obtain your AVS? MyChart    Are changes needed to the allergy or medication list? No    Reason for visit: RECHECK    No other vitals to report per pt    Marisol MAYNARDF

## 2024-02-20 NOTE — PROGRESS NOTES
"Virtual Visit Details    Type of service:  Video Visit     Originating Location (pt. Location): Home    Distant Location (provider location):  On-site  Platform used for Video Visit: [email or web address] Epilepsy Care Progress Note      Patient:  Fred Alva  :  1985   Age:  38 year old   Today's Virtual Visit:  2024    Epilepsy Data:    Patient History  Primary Epileptologist/Provider: Shira Sanchez M.D.  Epilepsy Syndrome: Temporal lobe epilepsy  Age of Onset: 14 years      Seizure Record  Current Visit Date: 2024  Previous Visit Date: 2022  Seizure Type 1: Simple partial seizures with sensory symptoms  Description of Sz Type 1: aura, consist of a crescendo sound, which lasts approximately 3 seconds, and he has this \"roller coaster\" feeling.  Seizure Type 2: Simple partial onset followed by impairment of consciousness  Description of Sz Type 2: They start with an aura, consist of a crescendo sound, which lasts approximately 3 seconds, and he has this \"roller coaster feeling,\" then he would blank out for 10-15 seconds.  Friends have witnessed fingers or toes twitching, then he would be confused for a few minutes.  Seizure Type 3: Tonic-clonic seizures         History of Present Illness:     Mr. Fred Alva is participating in this virtual visit  for a follow-up on his epilepsy.  He was last seen 2022.  He did not have any seizures since last visit.  He is taking lamotrigine 200 mg twice daily.  He denies dizziness, unsteadiness, double/blurred vision or other side effects.  lamotrigine levels have been within therapeutic range.     Lamotrigine level 2023: 5.7       Current Outpatient Medications   Medication Sig Dispense Refill    lamoTRIgine (LAMICTAL) 200 MG tablet Take 1 tablet (200 mg) by mouth 2 times daily 186 tablet 3    methocarbamol (ROBAXIN) 750 MG tablet Take 1 tablet by mouth 3 times daily      naproxen (NAPROSYN) 500 MG tablet Take 500 mg by " "mouth      simvastatin (ZOCOR) 10 MG tablet Take 1 tablet (10 mg) by mouth at bedtime 90 tablet 1        Medication Notes:        AED Medication Compliance:  compliant most of the time    Other Issues:    Is patient safe to drive:  Yes    Exam:    Ht 5' 9\" (175.3 cm)   Wt 220 lb (99.8 kg)   BMI 32.49 kg/m       Wt Readings from Last 5 Encounters:   02/20/24 220 lb (99.8 kg)   12/21/23 230 lb 3.2 oz (104.4 kg)   11/30/22 215 lb (97.5 kg)   12/21/21 212 lb 3.2 oz (96.3 kg)   06/29/21 205 lb (93 kg)     Alert, awake, NAD, no aphasia or dysarthria, EOMI, face symmetric, moves upper extremities against gravity.     Assessment and Plan:     #1 focal epilepsy: Excellent seizure control and side effect profile on lamotrigine monotherapy.  He has not had lamotrigine level since January 2020.  We will need to repeat lamotrigine level.      -Continue lamotrigine 200 mg twice a day.     -Obtain lamotrigine level     -Follow-up in 1 year.      As described above, I met with the patient for 6 minutes and during this time counseling was greater than 50% of the visit time.  Shira Sanchez MD                    "

## 2024-02-20 NOTE — LETTER
"2024       RE: Fred Alva  : 1985   MRN: [id number]      Dear Colleague,    Thank you for referring your patient, Fred Alva, to the [facility] EPILEPSY CARE at [facility]. Please see a copy of my visit note below.    Virtual Visit Details    Type of service:  Video Visit     Originating Location (pt. Location): Home    Distant Location (provider location):  On-site  Platform used for Video Visit: [facility] Epilepsy Care Progress Note      Patient:  Fred Alva  :  1985   Age:  38 year old   Today's Virtual Visit:  2024    Epilepsy Data:    Patient History  Primary Epileptologist/Provider: Shira Sanchez M.D.  Epilepsy Syndrome: Temporal lobe epilepsy  Age of Onset: 14 years      Seizure Record  Current Visit Date: 2024  Previous Visit Date: 2022  Seizure Type 1: Simple partial seizures with sensory symptoms  Description of Sz Type 1: aura, consist of a crescendo sound, which lasts approximately 3 seconds, and he has this \"roller coaster\" feeling.  Seizure Type 2: Simple partial onset followed by impairment of consciousness  Description of Sz Type 2: They start with an aura, consist of a crescendo sound, which lasts approximately 3 seconds, and he has this \"roller coaster feeling,\" then he would blank out for 10-15 seconds.  Friends have witnessed fingers or toes twitching, then he would be confused for a few minutes.  Seizure Type 3: Tonic-clonic seizures         History of Present Illness:     Mr. Fred Alva is participating in this virtual visit  for a follow-up on his epilepsy.  He was last seen 2022.  He did not have any seizures since last visit.  He is taking lamotrigine 200 mg twice daily.  He denies dizziness, unsteadiness, double/blurred vision or other side effects.  lamotrigine levels have been within therapeutic range.     Lamotrigine level 2023: 5.7   " "    Current Outpatient Medications   Medication Sig Dispense Refill     lamoTRIgine (LAMICTAL) 200 MG tablet Take 1 tablet (200 mg) by mouth 2 times daily 186 tablet 3     methocarbamol (ROBAXIN) 750 MG tablet Take 1 tablet by mouth 3 times daily       naproxen (NAPROSYN) 500 MG tablet Take 500 mg by mouth       simvastatin (ZOCOR) 10 MG tablet Take 1 tablet (10 mg) by mouth at bedtime 90 tablet 1        Medication Notes:        AED Medication Compliance:  compliant most of the time    Other Issues:    Is patient safe to drive:  Yes    Exam:    Ht 5' 9\" (175.3 cm)   Wt 220 lb (99.8 kg)   BMI 32.49 kg/m       Wt Readings from Last 5 Encounters:   02/20/24 220 lb (99.8 kg)   12/21/23 230 lb 3.2 oz (104.4 kg)   11/30/22 215 lb (97.5 kg)   12/21/21 212 lb 3.2 oz (96.3 kg)   06/29/21 205 lb (93 kg)     Alert, awake, NAD, no aphasia or dysarthria, EOMI, face symmetric, moves upper extremities against gravity.     Assessment and Plan:     #1 focal epilepsy: Excellent seizure control and side effect profile on lamotrigine monotherapy.  He has not had lamotrigine level since January 2020.  We will need to repeat lamotrigine level.      -Continue lamotrigine 200 mg twice a day.     -Obtain lamotrigine level     -Follow-up in 1 year.      As described above, I met with the patient for 6 minutes and during this time counseling was greater than 50% of the visit time.  Shira Sanchez MD                      Again, thank you for allowing me to participate in the care of your patient.      Sincerely,    Shira Sanchez MD    "

## 2024-04-04 ENCOUNTER — VIRTUAL VISIT (OUTPATIENT)
Dept: FAMILY MEDICINE | Facility: CLINIC | Age: 39
End: 2024-04-04
Attending: INTERNAL MEDICINE
Payer: COMMERCIAL

## 2024-04-04 DIAGNOSIS — G40.909 NONINTRACTABLE EPILEPSY WITHOUT STATUS EPILEPTICUS, UNSPECIFIED EPILEPSY TYPE (H): ICD-10-CM

## 2024-04-04 DIAGNOSIS — E78.5 DYSLIPIDEMIA: Primary | ICD-10-CM

## 2024-04-04 PROCEDURE — 99213 OFFICE O/P EST LOW 20 MIN: CPT | Mod: 95 | Performed by: INTERNAL MEDICINE

## 2024-04-04 RX ORDER — SIMVASTATIN 10 MG
10 TABLET ORAL AT BEDTIME
Qty: 90 TABLET | Refills: 1 | Status: SHIPPED | OUTPATIENT
Start: 2024-04-04 | End: 2024-07-18

## 2024-04-04 ASSESSMENT — PATIENT HEALTH QUESTIONNAIRE - PHQ9
SUM OF ALL RESPONSES TO PHQ QUESTIONS 1-9: 2
10. IF YOU CHECKED OFF ANY PROBLEMS, HOW DIFFICULT HAVE THESE PROBLEMS MADE IT FOR YOU TO DO YOUR WORK, TAKE CARE OF THINGS AT HOME, OR GET ALONG WITH OTHER PEOPLE: NOT DIFFICULT AT ALL
SUM OF ALL RESPONSES TO PHQ QUESTIONS 1-9: 2

## 2024-04-04 NOTE — PROGRESS NOTES
Fred is a 38 year old who is being evaluated via a billable video visit.    How would you like to obtain your AVS? MyChart  If the video visit is dropped, the invitation should be resent by: Text to cell phone: [phone]  Will anyone else be joining your video visit? No      Assessment and Plan  1. Dyslipidemia  Ongoing problem, uncontrolled.  Patient has been started on simvastatin on the recent annual physical when his cholesterol was elevated given the risk factors.  Patient states that he has been taking it regularly, will recheck with lipid panel in hepatic panel and do further recommendations if needed.  Patient understood and with the plan  - Lipid panel reflex to direct LDL Fasting; Future  - Comprehensive metabolic panel (BMP + Alb, Alk Phos, ALT, AST, Total. Bili, TP); Future  - simvastatin (ZOCOR) 10 MG tablet; Take 1 tablet (10 mg) by mouth at bedtime  Dispense: 90 tablet; Refill: 1    2. Nonintractable epilepsy without status epilepticus, unspecified epilepsy type (H)  Chronic stable condition, continue to follow neurology recommendations.  Continue current Lamictal as managed by neurology.         Please note that this note consists of symbols derived from keyboarding, dictation and/or voice recognition software. As a result, there may be errors in the script that have gone undetected. Please consider this when interpreting information found in this chart.    Patient Instructions   As discussed please make a lab only appointment in fasting for the recheck of your cholesterol as well as liver function for further need of titration of the dosage of medication if needed.  Will send the refills but please await till you get the lab results back before you pick the refills for any changes needed.  Return in about 9 months (around 12/21/2024), or if symptoms worsen or fail to improve, for Preventative Visit.    Jayde Dinero MD  [facility]   Fred is a 38 year  "old, presenting for the following health issues:  Recheck Medication        4/4/2024    12:58 PM   Additional Questions   Roomed by Skyla JOHNSTON     History of Present Illness       Hyperlipidemia:  He presents for follow up of hyperlipidemia.   He is taking medication to lower cholesterol. He is not having myalgia or other side effects to statin medications.      Last seen patient in December 2023 for annual physical, he is here for follow-up on hypercholesterolemia.  Denies any new concerns       Allergies   Allergen Reactions    Codeine Camsylate     Morphine And Related     No Clinical Screening - See Comments Unknown    Paxil [Serotonin Reuptake Inhibitors (Ssris)]         Past Medical History:   Diagnosis Date    Balance problem     Epilepsy (H)        Past Surgical History:   Procedure Laterality Date    ORTHOPEDIC SURGERY      L shoulder rotator repair    SHOULDER SURGERY Left High school    TONSILLECTOMY      TONSILLECTOMY      WISDOM TOOTH EXTRACTION         Family History   Problem Relation Age of Onset    Asthma Mother     Cancer Mother     Diabetes Father     Hypertension Father     Breast Cancer Mother     Rheumatoid Arthritis Mother     No Known Problems Sister        Social History     Tobacco Use    Smoking status: Some Days     Packs/day: .5     Types: Cigarettes     Passive exposure: Past (per pt)    Smokeless tobacco: Never    Tobacco comments:     Currently Occassionally once every 2 weeks.    Substance Use Topics    Alcohol use: Yes     Comment: \"it depends, I drink about 3 days a week\"        Current Outpatient Medications   Medication Sig Dispense Refill    lamoTRIgine (LAMICTAL) 200 MG tablet Take 1 tablet (200 mg) by mouth 2 times daily 186 tablet 3    methocarbamol (ROBAXIN) 750 MG tablet Take 1 tablet by mouth 3 times daily      naproxen (NAPROSYN) 500 MG tablet Take 500 mg by mouth      simvastatin (ZOCOR) 10 MG tablet Take 1 tablet (10 mg) by mouth at bedtime 90 tablet 1     No current " facility-administered medications for this visit.        Review of Systems  Constitutional, HEENT, cardiovascular, pulmonary, GI, , musculoskeletal, neuro, skin, endocrine and psych systems are negative, except as otherwise noted.      Objective           Vitals:  No vitals were obtained today due to virtual visit.    Physical Exam   GENERAL: alert and no distress  EYES: Eyes grossly normal to inspection.  No discharge or erythema, or obvious scleral/conjunctival abnormalities.  RESP: No audible wheeze, cough, or visible cyanosis.    SKIN: Visible skin clear. No significant rash, abnormal pigmentation or lesions.  NEURO: Cranial nerves grossly intact.  Mentation and speech appropriate for age.  PSYCH: Appropriate affect, tone, and pace of words      Video-Visit Details    Type of service:  Video Visit   Originating Location (pt. Location): Home    Distant Location (provider location):  On-site  Platform used for Video Visit: [facility]  Signed Electronically by: Jayde Dinero MD

## 2024-04-04 NOTE — PATIENT INSTRUCTIONS
As discussed please make a lab only appointment in fasting for the recheck of your cholesterol as well as liver function for further need of titration of the dosage of medication if needed.  Will send the refills but please await till you get the lab results back before you pick the refills for any changes needed.

## 2024-04-10 ENCOUNTER — LAB (OUTPATIENT)
Dept: LAB | Facility: CLINIC | Age: 39
End: 2024-04-10
Payer: COMMERCIAL

## 2024-04-10 DIAGNOSIS — E78.5 DYSLIPIDEMIA: ICD-10-CM

## 2024-04-10 LAB
ALBUMIN SERPL BCG-MCNC: 4.8 G/DL (ref 3.5–5.2)
ALP SERPL-CCNC: 51 U/L (ref 40–150)
ALT SERPL W P-5'-P-CCNC: 40 U/L (ref 0–70)
ANION GAP SERPL CALCULATED.3IONS-SCNC: 12 MMOL/L (ref 7–15)
AST SERPL W P-5'-P-CCNC: 33 U/L (ref 0–45)
BILIRUB SERPL-MCNC: 0.6 MG/DL
BUN SERPL-MCNC: 11.8 MG/DL (ref 6–20)
CALCIUM SERPL-MCNC: 9.4 MG/DL (ref 8.6–10)
CHLORIDE SERPL-SCNC: 106 MMOL/L (ref 98–107)
CHOLEST SERPL-MCNC: 160 MG/DL
CREAT SERPL-MCNC: 0.72 MG/DL (ref 0.67–1.17)
DEPRECATED HCO3 PLAS-SCNC: 22 MMOL/L (ref 22–29)
EGFRCR SERPLBLD CKD-EPI 2021: >90 ML/MIN/1.73M2
FASTING STATUS PATIENT QL REPORTED: YES
GLUCOSE SERPL-MCNC: 86 MG/DL (ref 70–99)
HDLC SERPL-MCNC: 36 MG/DL
LDLC SERPL CALC-MCNC: 91 MG/DL
NONHDLC SERPL-MCNC: 124 MG/DL
POTASSIUM SERPL-SCNC: 4 MMOL/L (ref 3.4–5.3)
PROT SERPL-MCNC: 7.2 G/DL (ref 6.4–8.3)
SODIUM SERPL-SCNC: 140 MMOL/L (ref 135–145)
TRIGL SERPL-MCNC: 166 MG/DL

## 2024-04-10 PROCEDURE — 36415 COLL VENOUS BLD VENIPUNCTURE: CPT

## 2024-04-10 PROCEDURE — 80053 COMPREHEN METABOLIC PANEL: CPT

## 2024-04-10 PROCEDURE — 80061 LIPID PANEL: CPT

## 2024-06-30 DIAGNOSIS — E78.5 DYSLIPIDEMIA: ICD-10-CM

## 2024-07-01 RX ORDER — SIMVASTATIN 10 MG
10 TABLET ORAL AT BEDTIME
Qty: 90 TABLET | Refills: 1 | OUTPATIENT
Start: 2024-07-01

## 2024-07-18 ENCOUNTER — MYC MEDICAL ADVICE (OUTPATIENT)
Dept: FAMILY MEDICINE | Facility: CLINIC | Age: 39
End: 2024-07-18
Payer: COMMERCIAL

## 2024-07-18 ENCOUNTER — MYC REFILL (OUTPATIENT)
Dept: FAMILY MEDICINE | Facility: CLINIC | Age: 39
End: 2024-07-18
Payer: COMMERCIAL

## 2024-07-18 DIAGNOSIS — E78.5 DYSLIPIDEMIA: ICD-10-CM

## 2024-07-18 RX ORDER — SIMVASTATIN 10 MG
10 TABLET ORAL AT BEDTIME
Qty: 90 TABLET | Refills: 1 | Status: CANCELLED | OUTPATIENT
Start: 2024-07-18

## 2024-07-18 RX ORDER — SIMVASTATIN 10 MG
10 TABLET ORAL AT BEDTIME
Qty: 90 TABLET | Refills: 0 | Status: SHIPPED | OUTPATIENT
Start: 2024-07-18

## 2024-07-18 NOTE — TELEPHONE ENCOUNTER
Change in pharmacy request. Resent per refill protocol.    Hubert ZAMBRANO RN 7/18/2024 at 3:38 PM

## 2024-10-31 ENCOUNTER — MYC REFILL (OUTPATIENT)
Dept: FAMILY MEDICINE | Facility: CLINIC | Age: 39
End: 2024-10-31
Payer: COMMERCIAL

## 2024-10-31 ENCOUNTER — MYC REFILL (OUTPATIENT)
Dept: NEUROLOGY | Facility: CLINIC | Age: 39
End: 2024-10-31

## 2024-10-31 DIAGNOSIS — G40.109 FOCAL EPILEPSY (H): ICD-10-CM

## 2024-10-31 DIAGNOSIS — E78.5 DYSLIPIDEMIA: ICD-10-CM

## 2024-10-31 RX ORDER — SIMVASTATIN 10 MG
10 TABLET ORAL AT BEDTIME
Qty: 90 TABLET | Refills: 0 | Status: SHIPPED | OUTPATIENT
Start: 2024-10-31

## 2024-11-07 RX ORDER — LAMOTRIGINE 200 MG/1
200 TABLET ORAL 2 TIMES DAILY
Qty: 186 TABLET | Refills: 3 | OUTPATIENT
Start: 2024-11-07

## 2024-11-07 NOTE — TELEPHONE ENCOUNTER
MD ordered year supply of Lamictal 200 mg on 02/20/2024. [employer] message sent to pt informing of refills on file and confirmed which pharmacy it is located.

## 2024-11-18 DIAGNOSIS — G40.109 FOCAL EPILEPSY (H): ICD-10-CM

## 2024-11-18 RX ORDER — LAMOTRIGINE 200 MG/1
200 TABLET ORAL 2 TIMES DAILY
Qty: 186 TABLET | Refills: 3 | OUTPATIENT
Start: 2024-11-18

## 2024-11-18 NOTE — TELEPHONE ENCOUNTER
Medication Question or Refill    Tried to refill at another pharmacy but they don't have it. [facility] called to get it filled at there pharmacy.    What medication are you calling about (include dose and sig)?:     lamoTRIgine (LAMICTAL) 200 MG tablet       Preferred Pharmacy:   [address] [address]  Phone: [phone] Fax: [phone]      Controlled Substance Agreement on file:   CSA -- Patient Level:    CSA: None found at the patient level.       Who prescribed the medication?:     Do you need a refill? Yes    When did you use the medication last?     Patient offered an appointment?

## 2024-11-26 DIAGNOSIS — G40.109 FOCAL EPILEPSY (H): ICD-10-CM

## 2024-11-26 RX ORDER — LAMOTRIGINE 200 MG/1
200 TABLET ORAL 2 TIMES DAILY
Qty: 186 TABLET | Refills: 3 | Status: SHIPPED | OUTPATIENT
Start: 2024-11-26

## 2024-11-26 NOTE — TELEPHONE ENCOUNTER
Last seen: 2/20/24  RTC: 1 year  Cancel: Yes  No-show: No  Next appt: Feb 2025    Incoming refill from patient via phones - requesting to be sent to new pharmacy    Medication requested: lamoTRIgine (LAMICTAL) 200 MG tablet   Directions: Take 1 tablet (200 mg) by mouth 2 times daily   Qty: 186 + 3   Last refilled: 2/20/24    Medication refill approved per refill protocol     Marcello Myers

## 2024-11-26 NOTE — TELEPHONE ENCOUNTER
Incoming call from pharmacy requesting we send prescription to the [facility] for LAMICTAL. Pt is out of medication.     Pharmacy [phone]

## 2025-02-02 ENCOUNTER — HEALTH MAINTENANCE LETTER (OUTPATIENT)
Age: 40
End: 2025-02-02

## 2025-05-06 ENCOUNTER — VIRTUAL VISIT (OUTPATIENT)
Dept: NEUROLOGY | Facility: CLINIC | Age: 40
End: 2025-05-06
Payer: COMMERCIAL

## 2025-05-06 DIAGNOSIS — G40.109 FOCAL EPILEPSY (H): Primary | ICD-10-CM

## 2025-05-06 DIAGNOSIS — G40.109 FOCAL EPILEPSY (H): ICD-10-CM

## 2025-05-06 NOTE — LETTER
"2025       RE: Fred Alva  : 1985   MRN: [id number]      Dear Colleague,    Thank you for referring your patient, Fred Alva, to the [facility] EPILEPSY CARE at [facility]. Please see a copy of my visit note below.    Is the patient currently in the state of [place]? YES    Visit mode:VIDEO    If the visit is dropped, the patient can be reconnected by: VIDEO VISIT: Text to cell phone: [phone]    Will anyone else be joining the visit? NO      How would you like to obtain your AVS? MyChart    Are changes needed to the allergy or medication list? NO    Reason for visit: Follow Up    Virtual Visit Details    Type of service:  Video Visit     Originating Location (pt. Location): At work, in a private room     Distant Location (provider location):  On-site  Platform used for Video Visit: [facility] Epilepsy Care Progress Note      Patient:  Fred Alva  :  1985   Age:  39 year old   Today's Virtual Visit:  2025    Epilepsy Data:    Patient History  Primary Epileptologist/Provider: Shira Sanchez M.D.  Epilepsy Syndrome: Temporal lobe epilepsy  Age of Onset: 14 years      Seizure Record  Current Visit Date: 25  Previous Visit Date: 2024  Seizure Type 1: Simple partial seizures with sensory symptoms  Description of Sz Type 1: aura, consist of a crescendo sound, which lasts approximately 3 seconds, and he has this \"roller coaster\" feeling.  Seizure Type 2: Simple partial onset followed by impairment of consciousness  Description of Sz Type 2: They start with an aura, consist of a crescendo sound, which lasts approximately 3 seconds, and he has this \"roller coaster feeling,\" then he would blank out for 10-15 seconds.  Friends have witnessed fingers or toes twitching, then he would be confused for a few minutes.  Seizure Type 3: Tonic-clonic seizures   Seizure Type 1: Simple partial seizures with " "sensory symptoms  Description of Sz Type 1: aura, consist of a crescendo sound, which lasts approximately 3 seconds, and he has this \"roller coaster\" feeling.  Seizure Type 2: Simple partial onset followed by impairment of consciousness  Description of Sz Type 2: They start with an aura, consist of a crescendo sound, which lasts approximately 3 seconds, and he has this \"roller coaster feeling,\" then he would blank out for 10-15 seconds.  Friends have witnessed fingers or toes twitching, then he would be confused for a few minutes.  Seizure Type 3: Tonic-clonic seizures   Previous Visit Date: 12/20/2022  Seizure Type 1: Simple partial seizures with sensory symptoms  Description of Sz Type 1: aura, consist of a crescendo sound, which lasts approximately 3 seconds, and he has this \"roller coaster\" feeling.  Seizure Type 2: Simple partial onset followed by impairment of consciousness  Description of Sz Type 2: They start with an aura, consist of a crescendo sound, which lasts approximately 3 seconds, and he has this \"roller coaster feeling,\" then he would blank out for 10-15 seconds.  Friends have witnessed fingers or toes twitching, then he would be confused for a few minutes.  Seizure Type 3: Tonic-clonic seizures       History of Present Illness:    Fred Alva, a 39-year-old male, returns to the clinic for a follow up on his epilepsy.  He was last seen 2/20/2024.  He reports experiencing grogginess in the morning after waking up approximately a month ago, which he suspects might have been due to a seizure and sleep, but is not sure.  He denies sore muscles, headache tongue bite or urinary incontinence or other symptoms suggesting a seizure. 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 He confirms that his previous seizures primarily occurred during sleep. He has been compliant with his medication regimen, taking lamotrigine 200 mg twice a " day regularly. His last blood level in December 2023, was within the normal range--5.7.      There have been no recent illnesses or emergency room visits reported.       Current Outpatient Medications   Medication Sig Dispense Refill     lamoTRIgine (LAMICTAL) 200 MG tablet Take 1 tablet (200 mg) by mouth 2 times daily. 186 tablet 3     naproxen (NAPROSYN) 500 MG tablet Take 500 mg by mouth       simvastatin (ZOCOR) 10 MG tablet Take 1 tablet (10 mg) by mouth at bedtime. 90 tablet 0     methocarbamol (ROBAXIN) 750 MG tablet Take 1 tablet by mouth 3 times daily          Medication Notes:        AED Medication Compliance:  compliant most of the time    Other Issues:    Is patient safe to drive:  Yes    Exam:    There were no vitals taken for this visit.     Wt Readings from Last 5 Encounters:   02/20/24 220 lb (99.8 kg)   12/21/23 230 lb 3.2 oz (104.4 kg)   11/30/22 215 lb (97.5 kg)   12/21/21 212 lb 3.2 oz (96.3 kg)   06/29/21 205 lb (93 kg)     Alert, awake, NAD, no aphasia or dysarthria, EOMI, face is symmetric, moves upper extremities against gravity.     Assessment and Plan:    focal epilepsy: Excellent seizure control and side effect profile on lamotrigine monotherapy.  He did not have a distinct seizure, however he thinks he might have had a seizure out of sleep about a month ago, in the setting of increased work-related stress.  He has not had lamotrigine level since December 2023.  He needs  to repeat lamotrigine level.       -Continue lamotrigine 200 mg twice a day.     -Obtain lamotrigine level     -Follow-up in 1 year.        Shira Sanchez MD                    Again, thank you for allowing me to participate in the care of your patient.        Sincerely,    Shira Sanchez MD

## 2025-05-06 NOTE — PROGRESS NOTES
"Is the patient currently in the state of [place]? YES    Visit mode:VIDEO    If the visit is dropped, the patient can be reconnected by: VIDEO VISIT: Text to cell phone: [phone]    Will anyone else be joining the visit? NO      How would you like to obtain your AVS? MyChart    Are changes needed to the allergy or medication list? NO    Reason for visit: Follow Up    Virtual Visit Details    Type of service:  Video Visit     Originating Location (pt. Location): At work, in a private room     Distant Location (provider location):  On-site  Platform used for Video Visit: [facility] Epilepsy Care Progress Note      Patient:  Fred Alva  :  1985   Age:  39 year old   Today's Virtual Visit:  2025    Epilepsy Data:    Patient History  Primary Epileptologist/Provider: Shira Sanchez M.D.  Epilepsy Syndrome: Temporal lobe epilepsy  Age of Onset: 14 years      Seizure Record  Current Visit Date: 25  Previous Visit Date: 2024  Seizure Type 1: Simple partial seizures with sensory symptoms  Description of Sz Type 1: aura, consist of a crescendo sound, which lasts approximately 3 seconds, and he has this \"roller coaster\" feeling.  Seizure Type 2: Simple partial onset followed by impairment of consciousness  Description of Sz Type 2: They start with an aura, consist of a crescendo sound, which lasts approximately 3 seconds, and he has this \"roller coaster feeling,\" then he would blank out for 10-15 seconds.  Friends have witnessed fingers or toes twitching, then he would be confused for a few minutes.  Seizure Type 3: Tonic-clonic seizures   Seizure Type 1: Simple partial seizures with sensory symptoms  Description of Sz Type 1: aura, consist of a crescendo sound, which lasts approximately 3 seconds, and he has this \"roller coaster\" feeling.  Seizure Type 2: Simple partial onset followed by impairment of consciousness  Description of Sz Type 2: They start with an aura, " "consist of a crescendo sound, which lasts approximately 3 seconds, and he has this \"roller coaster feeling,\" then he would blank out for 10-15 seconds.  Friends have witnessed fingers or toes twitching, then he would be confused for a few minutes.  Seizure Type 3: Tonic-clonic seizures   Previous Visit Date: 12/20/2022  Seizure Type 1: Simple partial seizures with sensory symptoms  Description of Sz Type 1: aura, consist of a crescendo sound, which lasts approximately 3 seconds, and he has this \"roller coaster\" feeling.  Seizure Type 2: Simple partial onset followed by impairment of consciousness  Description of Sz Type 2: They start with an aura, consist of a crescendo sound, which lasts approximately 3 seconds, and he has this \"roller coaster feeling,\" then he would blank out for 10-15 seconds.  Friends have witnessed fingers or toes twitching, then he would be confused for a few minutes.  Seizure Type 3: Tonic-clonic seizures       History of Present Illness:    Fred Alva, a 39-year-old male, returns to the clinic for a follow up on his epilepsy.  He was last seen 2/20/2024.  He reports experiencing grogginess in the morning after waking up approximately a month ago, which he suspects might have been due to a seizure and sleep, but is not sure.  He denies sore muscles, headache tongue bite or urinary incontinence or other symptoms suggesting a seizure. 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 He confirms that his previous seizures primarily occurred during sleep. He has been compliant with his medication regimen, taking lamotrigine 200 mg twice a day regularly. His last blood level in December 2023, was within the normal range--5.7.      There have been no recent illnesses or emergency room visits reported.       Current Outpatient Medications   Medication Sig Dispense Refill    lamoTRIgine (LAMICTAL) 200 MG tablet Take 1 tablet " (200 mg) by mouth 2 times daily. 186 tablet 3    naproxen (NAPROSYN) 500 MG tablet Take 500 mg by mouth      simvastatin (ZOCOR) 10 MG tablet Take 1 tablet (10 mg) by mouth at bedtime. 90 tablet 0    methocarbamol (ROBAXIN) 750 MG tablet Take 1 tablet by mouth 3 times daily          Medication Notes:        AED Medication Compliance:  compliant most of the time    Other Issues:    Is patient safe to drive:  Yes    Exam:    There were no vitals taken for this visit.     Wt Readings from Last 5 Encounters:   02/20/24 220 lb (99.8 kg)   12/21/23 230 lb 3.2 oz (104.4 kg)   11/30/22 215 lb (97.5 kg)   12/21/21 212 lb 3.2 oz (96.3 kg)   06/29/21 205 lb (93 kg)     Alert, awake, NAD, no aphasia or dysarthria, EOMI, face is symmetric, moves upper extremities against gravity.     Assessment and Plan:    focal epilepsy: Excellent seizure control and side effect profile on lamotrigine monotherapy.  He did not have a distinct seizure, however he thinks he might have had a seizure out of sleep about a month ago, in the setting of increased work-related stress.  He has not had lamotrigine level since December 2023.  He needs  to repeat lamotrigine level.       -Continue lamotrigine 200 mg twice a day.     -Obtain lamotrigine level     -Follow-up in 1 year.        Shira Sanchez MD

## 2025-05-28 ENCOUNTER — TELEPHONE (OUTPATIENT)
Dept: NEUROLOGY | Facility: CLINIC | Age: 40
End: 2025-05-28

## 2025-05-28 NOTE — TELEPHONE ENCOUNTER
Received DMV (LOC)  Form to be completed. Form saved to [employer], encounter routed.   ASHLEE Magdaleno

## 2025-06-04 NOTE — TELEPHONE ENCOUNTER
DMV form signed, faxed and mailed to DPS on 06/04/2025, sent to scanning, and copy mailed to patient.

## 2025-08-19 DIAGNOSIS — G40.109 FOCAL EPILEPSY (H): ICD-10-CM

## 2025-08-21 RX ORDER — LAMOTRIGINE 200 MG/1
200 TABLET ORAL 2 TIMES DAILY
Qty: 186 TABLET | Refills: 3 | Status: SHIPPED | OUTPATIENT
Start: 2025-08-21